# Patient Record
Sex: MALE | Race: WHITE
[De-identification: names, ages, dates, MRNs, and addresses within clinical notes are randomized per-mention and may not be internally consistent; named-entity substitution may affect disease eponyms.]

---

## 2017-02-20 ENCOUNTER — HOSPITAL ENCOUNTER (OUTPATIENT)
Dept: HOSPITAL 75 - LAB | Age: 47
End: 2017-02-20
Attending: NURSE PRACTITIONER
Payer: COMMERCIAL

## 2017-02-20 DIAGNOSIS — Z13.220: ICD-10-CM

## 2017-02-20 DIAGNOSIS — Z13.29: ICD-10-CM

## 2017-02-20 DIAGNOSIS — B20: Primary | ICD-10-CM

## 2017-02-20 LAB
ALBUMIN SERPL-MCNC: 4.1 G/DL (ref 3.2–4.5)
ALT SERPL-CCNC: 21 U/L (ref 0–55)
ANION GAP SERPL CALC-SCNC: 9 MMOL/L (ref 5–14)
AST SERPL-CCNC: 15 U/L (ref 5–34)
BASOPHILS # BLD AUTO: 0 10^3/UL (ref 0–0.1)
BASOPHILS NFR BLD AUTO: 0 % (ref 0–10)
BILIRUB SERPL-MCNC: 2.6 MG/DL (ref 0.1–1)
BUN SERPL-MCNC: 14 MG/DL (ref 7–18)
BUN/CREAT SERPL: 13
CALCIUM SERPL-MCNC: 8.7 MG/DL (ref 8.5–10.1)
CHLORIDE SERPL-SCNC: 110 MMOL/L (ref 98–107)
CHOLEST SERPL-MCNC: 148 MG/DL (ref ?–200)
CO2 SERPL-SCNC: 21 MMOL/L (ref 21–32)
CREAT SERPL-MCNC: 1.04 MG/DL (ref 0.6–1.3)
EOSINOPHIL # BLD AUTO: 0.1 10^3/UL (ref 0–0.3)
EOSINOPHIL NFR BLD AUTO: 2 % (ref 0–10)
ERYTHROCYTE [DISTWIDTH] IN BLOOD BY AUTOMATED COUNT: 12.8 % (ref 10–14.5)
GFR SERPLBLD BASED ON 1.73 SQ M-ARVRAT: > 60 ML/MIN
GLUCOSE SERPL-MCNC: 106 MG/DL (ref 70–105)
LDLC SERPL DIRECT ASSAY-MCNC: 88 MG/DL (ref 1–129)
LYMPHOCYTES # BLD AUTO: 2 X 10^3 (ref 1–4)
LYMPHOCYTES NFR BLD AUTO: 27 % (ref 12–44)
MCH RBC QN AUTO: 32 PG (ref 25–34)
MCHC RBC AUTO-ENTMCNC: 36 G/DL (ref 32–36)
MCV RBC AUTO: 87 FL (ref 80–99)
MONOCYTES # BLD AUTO: 1 X 10^3 (ref 0–1)
MONOCYTES NFR BLD AUTO: 14 % (ref 0–12)
NEUTROPHILS # BLD AUTO: 4.2 X 10^3 (ref 1.8–7.8)
NEUTROPHILS NFR BLD AUTO: 57 % (ref 42–75)
PLATELET # BLD: 184 10^3/UL (ref 130–400)
PMV BLD AUTO: 10.1 FL (ref 7.4–10.4)
POTASSIUM SERPL-SCNC: 3.8 MMOL/L (ref 3.6–5)
PROT SERPL-MCNC: 6.6 G/DL (ref 6.4–8.2)
RBC # BLD AUTO: 4.92 10^6/UL (ref 4.35–5.85)
SODIUM SERPL-SCNC: 140 MMOL/L (ref 135–145)
TRIGL SERPL-MCNC: 223 MG/DL (ref ?–150)
TSH SERPL-ACNC: 1.02 UIU/ML (ref 0.35–4.94)
VLDLC SERPL CALC-MCNC: 45 MG/DL (ref 5–40)
WBC # BLD AUTO: 7.4 10^3/UL (ref 4.3–11)

## 2017-02-20 PROCEDURE — 84439 ASSAY OF FREE THYROXINE: CPT

## 2017-02-20 PROCEDURE — 84443 ASSAY THYROID STIM HORMONE: CPT

## 2017-02-20 PROCEDURE — 86360 T CELL ABSOLUTE COUNT/RATIO: CPT

## 2017-02-20 PROCEDURE — 87536 HIV-1 QUANT&REVRSE TRNSCRPJ: CPT

## 2017-02-20 PROCEDURE — 80053 COMPREHEN METABOLIC PANEL: CPT

## 2017-02-20 PROCEDURE — 80061 LIPID PANEL: CPT

## 2017-02-20 PROCEDURE — 36415 COLL VENOUS BLD VENIPUNCTURE: CPT

## 2017-02-20 PROCEDURE — 85025 COMPLETE CBC W/AUTO DIFF WBC: CPT

## 2017-02-22 LAB
Lab: 28.9 % (ref 18–44)
Lab: 37 % (ref 15–39)
Lab: 7.3 /UL (ref 4.3–11)
Lab: 781 /UL (ref 200–1300)

## 2017-02-23 LAB
CD3+CD4+ CELLS/CD3+CD8+ CLL BLD: 1.1 RATIO (ref 1.2–6)
Lab: 41.2 %
Lab: 868.2 /UL (ref 500–2000)

## 2017-09-09 ENCOUNTER — HOSPITAL ENCOUNTER (OUTPATIENT)
Dept: HOSPITAL 75 - RAD | Age: 47
End: 2017-09-09
Attending: ORTHOPAEDIC SURGERY
Payer: COMMERCIAL

## 2017-09-09 DIAGNOSIS — M51.26: Primary | ICD-10-CM

## 2017-09-09 DIAGNOSIS — M48.06: ICD-10-CM

## 2017-09-09 PROCEDURE — 72148 MRI LUMBAR SPINE W/O DYE: CPT

## 2017-09-09 NOTE — DIAGNOSTIC IMAGING REPORT
PROCEDURE: MRI lumbar spine.



TECHNIQUE: Multiplanar, multisequence MRI of the lumbar spine was

performed without contrast.



INDICATION: Back pain. 



COMPARISON: None.



FINDINGS: There are 5 lumbar type vertebral bodies presumed for

the purposes of this report. Normal alignment. Vertebral body

heights are maintained. Normal bone marrow signal. No abnormal

signal in the conus which terminates at L1. Normal morphology of

the cauda equina without evidence of arachnoiditis. The

visualized abdominal and pelvic contents are unremarkable.



At L3-L4, there is a right subarticular radial tear and disc

protrusion which moderately narrows the right lateral recess.



At L4-L5, there is a right foraminal radial tear and disc

protrusion which, along with facet arthropathy at this level,

results in moderate right neural foraminal narrowing.



The intervertebral disc are otherwise preserved. No other

evidence of neural impingement in the lumbar spine.



IMPRESSION: Disc protrusions result in moderate right lateral

recess narrowing at L3-L4 and right neural foraminal narrowing at

L4-L5. Both of these findings would account for a right L4

radiculopathy.



Dictated by: 



  Dictated on workstation # [id number]

## 2017-09-18 ENCOUNTER — HOSPITAL ENCOUNTER (EMERGENCY)
Dept: HOSPITAL 75 - ER | Age: 47
Discharge: HOME | End: 2017-09-18
Payer: COMMERCIAL

## 2017-09-18 VITALS — SYSTOLIC BLOOD PRESSURE: 118 MMHG | DIASTOLIC BLOOD PRESSURE: 78 MMHG

## 2017-09-18 VITALS — WEIGHT: 220 LBS | HEIGHT: 72 IN | BODY MASS INDEX: 29.8 KG/M2

## 2017-09-18 DIAGNOSIS — M54.6: Primary | ICD-10-CM

## 2017-09-18 DIAGNOSIS — M54.41: ICD-10-CM

## 2017-09-18 PROCEDURE — 72146 MRI CHEST SPINE W/O DYE: CPT

## 2017-09-18 PROCEDURE — 72148 MRI LUMBAR SPINE W/O DYE: CPT

## 2017-09-18 PROCEDURE — 99282 EMERGENCY DEPT VISIT SF MDM: CPT
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
## 2017-09-18 NOTE — DIAGNOSTIC IMAGING REPORT
INDICATION: Mid back pain.



COMPARISON:  None.



TECHNIQUE: Multiplanar multisequence MRI images of the thoracic

spine were obtained without contrast.



FINDINGS:  

Alignment is normal. There is no subluxation or fracture. No

osseous lesion identified. The course and caliber of the thoracic

cord are normal. There is minimal to mild diffuse degenerative

disc disease. There is a broad-based posterior disc bulge at

T6-T7 and T7-T8. However, there is no foraminal or central canal

stenosis. The neural foramen and  posterior elements are normal.

There is no paraspinous mass.



IMPRESSION:

1. Minimal to mild diffuse degenerative disc disease with

posterior broad-based disc bulges at T6-T7, T7-T8.

2. No foraminal or central canal stenosis.

3. Normal cord signal.



Dictated by: 



  Dictated on workstation # [id number]

## 2017-09-18 NOTE — DIAGNOSTIC IMAGING REPORT
PROCEDURE: MRI lumbar spine.



TECHNIQUE: Multiplanar, multisequence MRI of the lumbar spine was

performed without contrast.



INDICATION: Back Pain. Radiculopathy. Recent trauma. 



COMPARISON: 9/9/17.



FINDINGS:  

Alignment is stable and unchanged. There is no subluxation or

fracture. There is no paraspinous mass or osseous lesion. No

traumatic disc herniation is identified. There is no epidural

hematoma or paraspinous mass. The conus medullaris and nerve

roots are grossly normal. The degenerative changes recently

described on the the prior examination at L3-4 and L4-5 are

unchanged.



IMPRESSION:

1. No traumatic malalignment or fracture.

2. No paraspinous mass or hematoma.

3. No traumatic disc herniation.

4. Stable degenerative changes at L3-4 and L4-5. See previously

dictated MRI report.



Dictated by: 



  Dictated on workstation # [id number]

## 2017-10-12 ENCOUNTER — HOSPITAL ENCOUNTER (OUTPATIENT)
Dept: HOSPITAL 75 - RAD | Age: 47
End: 2017-10-12
Attending: ORTHOPAEDIC SURGERY
Payer: COMMERCIAL

## 2017-10-12 DIAGNOSIS — M48.02: Primary | ICD-10-CM

## 2017-10-12 DIAGNOSIS — M54.12: ICD-10-CM

## 2017-10-12 PROCEDURE — 72141 MRI NECK SPINE W/O DYE: CPT

## 2017-10-12 NOTE — DIAGNOSTIC IMAGING REPORT
PROCEDURE: MR imaging cervical spine without contrast.



TECHNIQUE: Multiplanar, multisequence MR imaging of the cervical

spine was performed without contrast.



INDICATION: Neck pain.



FINDINGS: There is mild posterior translation of C5 over C6. 2 mm

of posterior translation of the vertebra is seen at this level.

Otherwise, the posterior spinal line alignment is satisfactory.

The vertebral body heights are preserved. There is moderate disc

height loss at C5/6 and mild disc height loss at C6/7 levels.



There is disc desiccation at all levels.



The spinal cord has normal signal and caliber.



The foramen magnum and upper cervical canal are patent.



C2/3: No disc herniation, no spinal canal or foraminal stenosis.



C3/4: No disc herniation, no spinal canal or foraminal stenosis.



C4/5: There is a right paracentral disc spur complex with no

associated spinal canal stenosis. There is uncovertebral joint

hypertrophy on the right side resulting in mild neural foraminal

stenosis. There is also mild foraminal stenosis on the left

related to facet joint hypertrophy.



C5/6: There is mild posterior translation of C5 over C6. There is

a prominent disc spur complex with slight inferior migration

component and a prominent posterior ligamentous hypertrophy.

There is severe spinal canal stenosis reducing the AP dimension

of the canal to 5.8 mm with mild cord compression. No cord signal

abnormality. There is bilateral severe foramina stenosis.



C6/7: There is a spur disc complex associated with

moderate-to-severe spinal canal stenosis reducing the AP

dimension of the canal to 6.7 mm. There is mild cord compression.

The foramina demonstrates severe stenosis on the left and

moderate stenosis on the right.



C7/T1: There is no disc herniation and no spinal canal stenosis.

The foramina demonstrate no stenosis on the right and moderate to

severe stenosis on the left. This appears to relate to mostly

facet and some uncovertebral hypertrophy.



IMPRESSION: There is severe spinal canal stenosis with associated

cord compression but no cord signal abnormality at C5/6 and C6/7

levels. 



Dictated by: 



  Dictated on workstation # [id number]

## 2019-02-19 ENCOUNTER — HOSPITAL ENCOUNTER (OUTPATIENT)
Dept: HOSPITAL 75 - PREOP | Age: 49
Discharge: HOME | End: 2019-02-19
Attending: SURGERY
Payer: COMMERCIAL

## 2019-02-19 VITALS — BODY MASS INDEX: 29.8 KG/M2 | HEIGHT: 72 IN | WEIGHT: 220 LBS

## 2019-02-19 DIAGNOSIS — Z01.818: Primary | ICD-10-CM

## 2019-02-20 ENCOUNTER — HOSPITAL ENCOUNTER (OUTPATIENT)
Dept: HOSPITAL 75 - ENDO | Age: 49
Discharge: HOME | End: 2019-02-20
Attending: SURGERY
Payer: COMMERCIAL

## 2019-02-20 VITALS — WEIGHT: 220 LBS | BODY MASS INDEX: 29.8 KG/M2 | HEIGHT: 72 IN

## 2019-02-20 VITALS — DIASTOLIC BLOOD PRESSURE: 102 MMHG | SYSTOLIC BLOOD PRESSURE: 140 MMHG

## 2019-02-20 VITALS — SYSTOLIC BLOOD PRESSURE: 136 MMHG | DIASTOLIC BLOOD PRESSURE: 94 MMHG

## 2019-02-20 VITALS — SYSTOLIC BLOOD PRESSURE: 133 MMHG | DIASTOLIC BLOOD PRESSURE: 101 MMHG

## 2019-02-20 VITALS — SYSTOLIC BLOOD PRESSURE: 140 MMHG | DIASTOLIC BLOOD PRESSURE: 102 MMHG

## 2019-02-20 DIAGNOSIS — K64.8: ICD-10-CM

## 2019-02-20 DIAGNOSIS — K31.89: ICD-10-CM

## 2019-02-20 DIAGNOSIS — E78.5: ICD-10-CM

## 2019-02-20 DIAGNOSIS — K29.50: Primary | ICD-10-CM

## 2019-02-20 DIAGNOSIS — Z79.899: ICD-10-CM

## 2019-02-20 DIAGNOSIS — I10: ICD-10-CM

## 2019-02-20 DIAGNOSIS — G62.9: ICD-10-CM

## 2019-02-20 DIAGNOSIS — K21.0: ICD-10-CM

## 2019-02-20 DIAGNOSIS — K29.80: ICD-10-CM

## 2019-02-20 DIAGNOSIS — F41.9: ICD-10-CM

## 2019-02-20 DIAGNOSIS — K57.30: ICD-10-CM

## 2019-02-20 DIAGNOSIS — K44.9: ICD-10-CM

## 2019-02-20 DIAGNOSIS — D12.2: ICD-10-CM

## 2019-02-20 DIAGNOSIS — D12.4: ICD-10-CM

## 2019-02-20 DIAGNOSIS — F20.9: ICD-10-CM

## 2019-02-20 DIAGNOSIS — B20: ICD-10-CM

## 2019-02-20 DIAGNOSIS — F17.210: ICD-10-CM

## 2019-02-20 DIAGNOSIS — F31.9: ICD-10-CM

## 2019-02-20 DIAGNOSIS — K22.70: ICD-10-CM

## 2019-02-20 PROCEDURE — 88305 TISSUE EXAM BY PATHOLOGIST: CPT

## 2019-02-20 NOTE — ENDOSCOPY DISCHARGE INSTRUCT
Endo Procedure/Findings


Findings


1.:  Gastritis, Padilla's Esophagus


2.:  Polyp


3.:  Diverticulosis


4.:  Internal Hemorrhoids





Discharge Instructions


-


Activity: You might feel a little sleepy until tomorrow.  This is due to the 

medicine you received to relax you.





Until tomorrow, you should:  


   NOT drive a car, operate machinery or power tools.


   NOT drink any alcoholic beverages.


   NOT make any important decisions or sign importortant papers.





Do not return to work until tomorrow, unless otherwise instructed. Resume 

previous activities tomorrow.





Diet: Start by taking liquids.  If you tolerate liquids, advance to solid food.





make an appointment for one week.


Instructions:


1.:  Colonscopy in 3 years


2.:  EGD in 6-8 weeks





Notify Physician


-


If you experience excessive bleeding, unusual abdominal pain, fever, or chest 

pain, contact your doctor immediately.





Follow-Up:


-


I have received and understand the above instructions and will call my doctor 

if I have any further questions.








________________________             ____________


Patient Signature                Date





________________________


Nurse Signature








________________________


Other (Relationship)











HIRAM ESPINO DO Feb 20, 2019 15:48

## 2019-02-20 NOTE — XMS REPORT
[facility]

 Created on: 2017



Ronda Farhta

External Reference #: [id number]

: 1970

Sex: Male



Demographics







 Address  [address] [place] [address]  [address]

 

 Preferred Language  Unknown

 

 Marital Status  Unknown

 

 Rastafarian Affiliation  Unknown

 

 Race  Unknown

 

 Ethnic Group  Unknown





Author







 Author  Lana Durand

 

 [facility]

 

 Address  [address], [place]  [id number]



 

 Phone  [phone]







Care Team Providers







 Care Team Member Name  Role  Phone

 

 Lana Durand  Unavailable  [phone]







PROBLEMS







 Type  Condition  ICD9-CM Code  JDT30-XQ Code  Onset Dates  Condition Status  
SNOMED Code

 

 Problem  Benign essential hypertension     I10     Active  8578039

 

 Problem  Arthralgia of right ankle     M25.571     Active  470822313

 

 Problem  Depression     F32.9     Active  09998599

 

 Problem  Acquired immunodeficiency syndrome     B20     Active  39852259

 

 Problem  Mixed hyperlipidemia     E78.2     Active  393175002

 

 Problem  Generalized osteoarthritis of multiple sites     M15.9     Active  
[id number]

 

 Problem  Bronchiolitis     J21.9     Active  4231583

 

 Problem  Cigarette smoker     F17.210     Active  51405834

 

 Problem  Erectile dysfunction, unspecified erectile dysfunction type     N52.9
     Active  092838197

 

 Problem  Other chronic pain     G89.29     Active  14686914

 

 Problem  Crushing injury of right foot, sequela     S97.81XS     Active  
64874186







ALLERGIES

No Known Allergies



SOCIAL HISTORY

Never Assessed



PLAN OF CARE







 Activity  Details









  









 Follow Up  3 Months Reason:

 

 Pending Test  Human Immunodeficiency Virus (HIV-1), Quantitative, Real-time 
PCR (graph) 75497 

 

 Pending Test  Metabolic Panel (14), Comprehensive (CMP) 54581 

 

 Pending Test  CD4/CD8 Ratio Profile 93980 



   



VITAL SIGNS







 Height  72 in  2017

 

 Weight  223 lbs  2017

 

 Temperature  96.9 degrees Fahrenheit  2017

 

 Heart Rate  91 /min  2017

 

 Respiratory Rate  18 /min  2017

 

 Oximetry  97 %  2017

 

 BMI  30.24 kg/m2  2017

 

 Blood pressure systolic  148 mm Hg  2017

 

 Blood pressure diastolic  86 mm Hg  2017







MEDICATIONS







 Medication  Instructions  Dosage  Frequency  Start Date  End Date  Duration  
Status

 

 Ventolin  (90 Base) MCG/ACT  Inhalation every 4 hrs  2 puffs as needed  
4h  10 Feb, 2017     30 days  Active

 

 Lisinopril 40     TAKE ONE TABLET BY MOUTH DAILY           30  Active

 

 Voltaren 1 %  Transdermal four times a day  2-4 gm apply to right foot/ankle  
6h  21 Oct, 2016     30 days  Active

 

 Norco  MG  Orally every 12 hrs  1 tablet as needed  12h  11 Aug, 2017   
  30 days  Active

 

 Pravastatin Sodium 40 MG  Orally Once a day  1 tablet  24h  04 2015     
30 day(s)  Active

 

 Cialis 20 MG  Orally every 72 hrs  1 tablet as needed     17 May, 2013     30 
days  Active

 

 Evotaz 300/150 mg  Oral Daily  1  24h  15 May, 2015     30 days  Active

 

 Zoloft 100 MG  Orally Once a day  1 tablet  24h  30 2015     30 days  
Active

 

 Descovy 200-25 mg  Orally Once a day  1 Tablet  24h       30 days  
Active

 

 Ventolin HFA 90  Inhalation every 4 hrs  2 puffs as needed  4h        16  
Active

 

 Ibuprofen 800     TAKE ONE TABLET BY MOUTH THREE TIMES A DAY AS NEEDED        
   30  Active







RESULTS

No Results



PROCEDURES







 Procedure  Date Ordered  Result  Body Site

 

 COMPLETE BLOOD COUNT W/AUTO DIFF  2017      

 

 T CELL, ABSOLUTE COUNT/RATIO  2017      

 

 COMPREHEN METABOLIC PANEL  2017      

 

 HIV-1, DNA, QUANT  2017      

 

 IMMUNIZATION ADMIN  2017      

 

 FLU VAC NO PRSV 4 SUNI 3 YRS+  2017      







IMMUNIZATIONS







 Vaccine  Route  Administration Date  Status

 

 Influenza Split 3 yrs > (QUAD)  IM Intramuscular  2017  Administered







MEDICAL (GENERAL) HISTORY







 Type  Description  Date

 

 Medical History  Benign essential HTN , (Desc:Benign essential hypertension) ;
   

 

 Medical History  Acquired immune deficiency syndrome ;   

 

 Medical History  Smoking ;   

 

 Medical History  Human immunodeficiency virus (HIV) disease   

 

 Medical History  Nondependent tobacco use disorder   

 

 Medical History  Essential hypertension, benign   

 

 Medical History  Pain in soft tissues of limb   

 

 Medical History  Hyperglycemia   

 

 Medical History  ED (erectile dysfunction)   

 

 Medical History  Hyperlipidemia   

 

 Medical History  Smoker   

 

 Medical History  Depression   

 

 Surgical History  R fibula fx  2007

## 2019-02-20 NOTE — XMS REPORT
[facility]

 Created on: 2018



Farhat Bond

External Reference #: [id number]

: 1970

Sex: Male



Demographics







 Address  [address] [place] [address]  [address]

 

 Preferred Language  Unknown

 

 Marital Status  Unknown

 

 Hindu Affiliation  Unknown

 

 Race  Unknown

 

 Ethnic Group  Unknown





Author







 Author  Annia Chen

 

 Organization  [facility]

 

 Address  [address] [place] [address]  [id number]



 

 Phone  [phone]







Care Team Providers







 Care Team Member Name  Role  Phone

 

 Annia Chen  Unavailable  [phone]







PROBLEMS







 Type  Condition  ICD9-CM Code  MTX41-NZ Code  Onset Dates  Condition Status  
SNOMED Code

 

 Problem  Benign essential hypertension     I10     Active  2983716

 

 Problem  Arthralgia of right ankle     M25.571     Active  459761122

 

 Problem  Depression     F32.9     Active  94745721

 

 Problem  Acquired immunodeficiency syndrome     B20     Active  70840657

 

 Problem  Mixed hyperlipidemia     E78.2     Active  127610696

 

 Problem  Generalized osteoarthritis of multiple sites     M15.9     Active  
779175520

 

 Problem  Bronchiolitis     J21.9     Active  0901520

 

 Problem  Cigarette smoker     F17.210     Active  14393239

 

 Problem  Erectile dysfunction, unspecified erectile dysfunction type     N52.9
     Active  618301246

 

 Problem  Other chronic pain     G89.29     Active  70362128

 

 Problem  Crushing injury of right foot, sequela     S97.81XS     Active  
98608916







ALLERGIES

No Information



ENCOUNTERS







 Encounter  Location  Date  Diagnosis

 

 [facility]  [address] [place] [address] 
[id number]     

 

 [address] [address]     

 

 [address] [address]  15 
May, 2018  Acquired immunodeficiency syndrome B20 and Human immunodeficiency 
virus (HIV) disease 042

 

 [facility] [address] 
[id number]    Acquired immunodeficiency syndrome B20

 

 [address] [address]     

 

 [address] [address]     

 

 [address] [address]     

 

 [facility]  [address] [place] [id number]    Acquired immunodeficiency syndrome B20 and Influenza vaccine needed 
Z23

 

 [facility]  [address] [place] [id number]  24 
Oct, 2017   

 

 [address] [address]  03 
Oct, 2017  Depression F32.9

 

 [address] [address]  24 
Aug, 2017   

 

 [facility] [address] 
[id number]  11 Aug, 2017  Acquired immune deficiency syndrome B20 ; Long term 
current use of opiate analgesic Z79.891 ; Benign essential hypertension I10 ; 
Mixed hyperlipidemia E78.2 ; Cigarette smoker F17.210 and Generalized 
osteoarthritis of multiple sites M15.9

 

 [address] 
[id number]  12 May, 2017  Acquired immune deficiency syndrome B20 ; Depression 
F32.9 ; Benign essential hypertension I10 ; Mixed hyperlipidemia E78.2 and 
Cigarette smoker F17.210

 

 [address] [address]     

 

 [address] 
[id number]  10 Feb, 2017  Acquired immune deficiency syndrome B20 ; Screening 
examination for sexually transmitted disease Z11.3 ; Pain in right ankle and 
joints of right foot M25.571 ; Other chronic pain G89.29 ; Bronchiolitis J21.9 
and Depression F32.9

 

 [facility]  [address] [place] [id number]  21 Oct, 2016  
Acquired immunodeficiency syndrome B20 ; Influenza vaccine needed Z23 ; 
Erectile dysfunction, unspecified erectile dysfunction type N52.9 ; Arthralgia 
of right ankle M25.571 and Crushing injury of right foot, sequela S97.81XS

 

 [address] [address]  08 
Sep, 2016   

 

 [address] [address]  08 
Sep, 2016   

 

 [address] 
[id number]    Acquired immunodeficiency syndrome B20

 

 [facility] [address] 
[id number]  18 Mar, 2016  Acquired immune deficiency syndrome B20 ; Encounter 
for immunization Z23 ; Screening examination for sexually transmitted disease 
Z11.3 and Depression F32.9

 

 [facility]  [address] [place] [id number]     

 

 [address] [address]    Essential (primary) hypertension I10

 

 [facility] [address] 
[id number]  18 Dec, 2015  Human immunodeficiency virus (HIV) disease B20 and 
Smoking F17.200

 

 [address] [address]  10 
Nov, 2015   

 

 [address] [address]    Depression F32.9

 

 [address] [address]  24 
Sep, 2015  Hyperlipidemia 272.4

 

 [address] [address]  10 
Sep, 2015  Hyperlipidemia 272.4

 

 [address] 
[id number]  04 Sep, 2015  Human immunodeficiency virus (HIV) disease 042 ; Needs 
flu shot V04.81 ; Hyperlipidemia 272.4 ; Smoker 305.1 and Depression 311

 

 [address] [address]  12 
Aug, 2015   

 

 [address] [address]     

 

 [facility] [address] 
[id number]  15 May, 2015  Human immunodeficiency virus (HIV) disease 042 ; 
Nondependent tobacco use disorder 305.1 ; Essential hypertension, benign 401.1 
and Hyperglycemia 790.29

 

 [address] [address]     

 

 [address] [address]     

 

 [facility]  [address] [place] [facility] [place] [address]     

 

 [facility]  [address] [place] [facility] [address]     

 

 [facility]  [address] [place] 
[id number]    Asymptomatic human immunodeficiency virus (HIV) 
infection status V08 ; Smoker 305.1 and Depression 311

 

 [facility]  [address] [place], [place] 
[id number]  12 Sep, 2014  Essential hypertension, benign 401.1 ; Nondependent 
tobacco use disorder 305.1 and HIV (human immunodeficiency virus infection) V08

 

 [facility] [place] [facility]  [address] [facility] [address]  [place], [place] [id number]  29 Aug, 2014 
  

 

 [facility] [place] [facility]  [address] [facility] [address]  [place], [place] [id number]   
  

 

  [facility]  [address] [place] [facility] [place] [address]     

 

 [facility]  [address] [place] [facility] [address]     

 

 [facility]  [address] [place] [facility] [address]  04 
Oct, 2013   

 

 [facility]  [address] [place] [facility] [address]     

 

 [facility]  [address] [place] [facility] [address]     

 

 [facility]  [address] [place] [facility] [address]     

 

 [facility]  [address] [place] [facility] [address]     

 

 [facility]  [address] [place] [facility] [address]  14 
Sep, 2012   

 

 [facility]  [address] [place] [facility] [address]  03 
Aug, 2012   

 

 [facility]  [address] [place] [facility] [address]  30 
Mar, 2012   

 

 [facility]  [address] [place] [address]     

 

 [facility]  [address] [facility] [address]  [place], [place] [id number]  07 Oct, 2011 
  







IMMUNIZATIONS

No Known Immunizations



SOCIAL HISTORY

Never Assessed



REASON FOR VISIT





PLAN OF CARE





VITAL SIGNS





MEDICATIONS







 Medication  Instructions  Dosage  Frequency  Start Date  End Date  Duration  
Status

 

 Ibuprofen 800 mg     TAKE ONE TABLET BY MOUTH THREE TIMES A DAY AS NEEDED     
      30  Active







RESULTS

No Results



PROCEDURES

No Known procedures



INSTRUCTIONS





MEDICATIONS ADMINISTERED

No Known Medications



MEDICAL (GENERAL) HISTORY







 Type  Description  Date

 

 Medical History  Benign essential HTN , (Desc:Benign essential hypertension) ;
   

 

 Medical History  Acquired immune deficiency syndrome ;   

 

 Medical History  Smoking ;   

 

 Medical History  Human immunodeficiency virus (HIV) disease   

 

 Medical History  Nondependent tobacco use disorder   

 

 Medical History  Essential hypertension, benign   

 

 Medical History  Pain in soft tissues of limb   

 

 Medical History  Hyperglycemia   

 

 Medical History  ED (erectile dysfunction)   

 

 Medical History  Hyperlipidemia   

 

 Medical History  Smoker   

 

 Medical History  Depression   

 

 Surgical History  R fibula fx  2007

## 2019-02-20 NOTE — XMS REPORT
[facility]

 Created on: 2016



AimeesFarhat

External Reference #: [id number]

: 1970

Sex: Male



Demographics







 Address  [address] 

[place]  [address]

 

 Home Phone  [phone]

 

 Preferred Language  Unknown

 

 Marital Status  Unknown

 

 Latter day Affiliation  Unknown

 

 Race  White

 

 Ethnic Group  Not  or 





Author







 Author  Johanna See

 

 [facility]  eClinical[employer]

 

 Address  Unknown

 

 Phone  Unavailable







Care Team Providers







 Care Team Member Name  Role  Phone

 

 Johanna See    Unavailable



                                                                



Allergies

          No Known Allergies                                                   
                                     



Problems

          





 Problem Type  Condition  Code  Onset Dates  Condition Status

 

 Assessment  Hyperglycemia  790.29     Active

 

 Problem  Smoking  F17.200     Active

 

 Problem  Benign essential hypertension  I10     Active

 

 Problem  Acquired immunodeficiency syndrome  B20     Active

 

 Assessment  Nondependent tobacco use disorder  305.1     Active

 

 Assessment  Essential hypertension, benign  401.1     Active

 

 Problem  Mixed hyperlipidemia  E78.2     Active

 

 Assessment  Human immunodeficiency virus (HIV) disease  042     Active



                                                                               
                                                                               



Medications

          





 Medication  Code System  Code  Instructions  Start Date  End Date  Status  
Dosage

 

 Pravastatin Sodium  NDC  [address]-10  40 MG Orally QHS  2015        
1 tablet

 

 Ibuprofen  NDC  70943191160  800             TAKE ONE TABLET BY MOUTH THREE 
TIMES A DAY AS NEEDED

 

 Lisinopril  NDC  98541046040  20 Orally Once a day           2 tablet s

 

 Evotaz  NDC  6478-5081-99  300/150 mg Oral Daily  May 15, 2015        1

 

 Cialis  NDC  [address]-29  20 MG Oral 1 (one) Tablet every 72 hours prn  May 
17, 2013        mailed to patient

 

 Jhonny  NDC  35380012849  200-300             TAKE ONE TABLET BY MOUTH EVERY 
DAY

 

 Chantix  NDC  [address]-08  1 MG Orally Twice a day  2015        1 
tablet

 

 Zoloft  NDC  [address]-80  100 MG Orally Once a day  2015        1 
tablet



                                                                               
                                                                               



Procedures

          





 Procedure  Coding System  Code  Date

 

 COMPREHEN METABOLIC PANEL  CPT-4  40100  May 15, 2015

 

 GLYCATED HEMOGLOBIN TEST SO  CPT-4  21732  May 15, 2015

 

 T CELL, ABSOLUTE COUNT/RATIO  CPT-4  87961  May 15, 2015

 

 VENIPUNCT, ROUTINE*  CPT-4  28552  May 15, 2015

 

  3-10 Min in SX Smoker  CPT-4  56714  May 15, 2015

 

 ASSAY OF INSULIN  CPT-4  00908  May 15, 2015

 

 COMPLETE BLOOD COUNT W/AUTO DIFF  CPT-4  83289  May 15, 2015

 

 Office Visit, Est Pt., Level 3  CPT-4  84204  May 15, 2015



                                                                               
                                                                               
          



Vital Signs

          





 Date/Time:  May 15, 2015

 

 Temperature  98.0 F

 

 Weight  224.4 lbs

 

 Height  71.5 in

 

 Respiratory Rate  18 /min

 

 Cardiac Monitoring Heart Rate  67 /min

 

 Blood Pressure Diastolic  96 mm Hg

 

 Blood Pressure Systolic  153 mm Hg

 

 BMI  30.86 Index



                                                                              



Results

          No Known Results                                                     
               



Summary Purpose

          eClinicalWorks Submission
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
## 2019-02-20 NOTE — XMS REPORT
[facility]

 Created on: 2018



Farhat Bond

External Reference #: [id number]

: 1970

Sex: Male



Demographics







 Address  [address] [place] [address]  [address]

 

 Preferred Language  Unknown

 

 Marital Status  Unknown

 

 Jewish Affiliation  Unknown

 

 Race  Unknown

 

 Ethnic Group  Unknown





Author







 Author  Annia Chen

 

 [facility]

 

 Address  [address] [place], [place]  [address]



 

 Phone  [phone]







Care Team Providers







 Care Team Member Name  Role  Phone

 

 Annia Chen  Unavailable  [phone]







PROBLEMS





ALLERGIES

No Information



ENCOUNTERS





IMMUNIZATIONS

No Known Immunizations



SOCIAL HISTORY

No smoking Hx information available



REASON FOR VISIT





PLAN OF CARE





VITAL SIGNS





MEDICATIONS

Unknown Medications



RESULTS

No Results



PROCEDURES

No Known procedures



INSTRUCTIONS





MEDICATIONS ADMINISTERED

No Known Medications



MEDICAL (GENERAL) HISTORY

## 2019-02-20 NOTE — XMS REPORT
[facility]

 Created on: 2015



AimeesFarhat

External Reference #: [id number]

: 1970

Sex: Male



Demographics







 Address  [address] [place] [address]  [address]

 

 Home Phone  [phone]

 

 Preferred Language  Unknown

 

 Marital Status  Unknown

 

 Judaism Affiliation  Unknown

 

 Race  White

 

 Ethnic Group  Not  or 





Author







 Author  Annia Chen

 

 [facility]  [employer]

 

 Address  Unknown

 

 Phone  Unavailable







Care Team Providers







 Care Team Member Name  Role  Phone

 

 Annia Chen  CP  Unavailable



                                                                



Allergies, Adverse Reactions, Alerts

          





 Substance  Reaction  Event Type

 

 N.K.D.A.  Info Not Available  Non Drug Allergy



                                                                               
         



Problems

          





 Problem Type  Condition  Code  Onset Dates  Condition Status

 

 Problem  Acquired immunodeficiency syndrome  B20     Active

 

 Problem  Smoking  F17.200     Active

 

 Problem  Depression  F32.9     Active

 

 Assessment  Human immunodeficiency virus (HIV) disease  B20     Active

 

 Assessment  Smoking  F17.200     Active

 

 Problem  Benign essential hypertension  I10     Active

 

 Problem  Mixed hyperlipidemia  E78.2     Active



                                                                               
                                                                     



Medications

          





 Medication  Code System  Code  Instructions  Start Date  End Date  Status  
Dosage

 

 Ibuprofen  NDC  61491720403  800MG             TAKE ONE TABLET BY MOUTH THREE 
TIMES DAILY AS NEEDED

 

 Cialis  NDC  [address]-09  20 MG Orally every 72 hrs  May 17, 2013        1 
tablet as needed

 

 Zoloft  NDC  [address]-84  100 MG Orally Once a day  2015        1 
tablet

 

 Evotaz  NDC  7466-3995-41  300/150 mg Oral Daily  May 15, 2015        1

 

 Truvada  NDC  08039360096  200-300 MG             TAKE ONE TABLET BY MOUTH 
ONCE DAILY

 

 Lisinopril  NDC  30543723044  20 Orally Once a day           2 tablet s

 

 Pravastatin Sodium  NDC  [address]-10  40 MG Orally Once a day  2015 
       1 tablet

 

 Wellbutrin SR  NDC  [address]-86  150 MG Orally Twice a day  2015   
     1 tablet



                                                                               
                                                                               



Procedures

          





 Procedure  Coding System  Code  Date

 

 COMPREHEN METABOLIC PANEL  CPT-4  09584  Dec 18, 2015

 

 Office Visit, Est Pt., Level 3  CPT-4  12584  Dec 18, 2015

 

 T CELL, ABSOLUTE COUNT/RATIO  CPT-4  41913  Dec 18, 2015



                                                                               
                                       



Vital Signs

          





 Date/Time:  Dec 18, 2015

 

 Temperature  97.7 F

 

 Weight  212 lbs

 

 Height  72 in

 

 Respiratory Rate  18 /min

 

 Cardiac Monitoring Heart Rate  78 /min

 

 Blood Pressure Diastolic  92 mm Hg

 

 Blood Pressure Systolic  138 mm Hg

 

 BMI  28.75 Index

 

 Oximetry  98 %



                                                                              



Results

          No Known Results                                                     
               



Summary Purpose

          eClinicalWorks Submission
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
## 2019-02-20 NOTE — XMS REPORT
[facility]

 Created on: 2017



Farhat Bond

External Reference #: [id number]

: 1970

Sex: Male



Demographics







 Address  [address] [place] [address]  [address]

 

 Preferred Language  Unknown

 

 Marital Status  Unknown

 

 Judaism Affiliation  Unknown

 

 Race  Unknown

 

 Ethnic Group  Unknown





Author







 Author  Johanna See

 

 [facility]

 

 Address  [address] [place], [place]  [address]



 

 Phone  [phone]







Care Team Providers







 Care Team Member Name  Role  Phone

 

 Johanna See  Unavailable  [phone]







PROBLEMS







 Type  Condition  ICD9-CM Code  VJF73-BP Code  Onset Dates  Condition Status  
SNOMED Code

 

 Problem  Acquired immunodeficiency syndrome     B20     Active  72595967

 

 Problem  Cigarette smoker     F17.210     Active  50390615

 

 Problem  Depression     F32.9     Active  01019322

 

 Problem  Mixed hyperlipidemia     E78.2     Active  772473055

 

 Problem  Benign essential hypertension     I10     Active  1109232

 

 Problem  Generalized osteoarthritis of multiple sites     M15.9     Active  
438307275

 

 Problem  Other chronic pain     G89.29     Active  49276688

 

 Problem  Crushing injury of right foot, sequela     S97.81XS     Active  
76184250

 

 Problem  Arthralgia of right ankle     M25.571     Active  701365369

 

 Problem  Bronchiolitis     J21.9     Active  9819056

 

 Problem  Erectile dysfunction, unspecified erectile dysfunction type     N52.9
     Active  161127333







ALLERGIES

Unknown Allergies



SOCIAL HISTORY

No smoking Hx information available



PLAN OF CARE





VITAL SIGNS





MEDICATIONS

Unknown Medications



RESULTS

No Results



PROCEDURES

No Known procedures



IMMUNIZATIONS

No Known Immunizations

## 2019-02-20 NOTE — XMS REPORT
[facility]

 Created on: 10/27/2017



Farhat Bond

External Reference #: [id number]

: 1970

Sex: Male



Demographics







 Address  [address]  [address]

 

 Preferred Language  Unknown

 

 Marital Status  Unknown

 

 Hoahaoism Affiliation  Unknown

 

 Race  Unknown

 

 Ethnic Group  Unknown





Author







 Author  Annia Chen

 

 [facility]

 

 Address  [address] [place], [place]  [address]



 

 Phone  [phone]







Care Team Providers







 Care Team Member Name  Role  Phone

 

 Annia Chen  Unavailable  [phone]







PROBLEMS







 Type  Condition  ICD9-CM Code  JPQ16-UD Code  Onset Dates  Condition Status  
SNOMED Code

 

 Problem  Benign essential hypertension     I10     Active  0038811

 

 Problem  Arthralgia of right ankle     M25.571     Active  522443332

 

 Problem  Depression     F32.9     Active  72754843

 

 Problem  Acquired immunodeficiency syndrome     B20     Active  76125554

 

 Problem  Mixed hyperlipidemia     E78.2     Active  273311607

 

 Problem  Generalized osteoarthritis of multiple sites     M15.9     Active  
391044735

 

 Problem  Bronchiolitis     J21.9     Active  9886656

 

 Problem  Cigarette smoker     F17.210     Active  69560264

 

 Problem  Erectile dysfunction, unspecified erectile dysfunction type     N52.9
     Active  414526182

 

 Problem  Other chronic pain     G89.29     Active  75234806

 

 Problem  Crushing injury of right foot, sequela     S97.81XS     Active  
77115070







ALLERGIES

No Information



SOCIAL HISTORY

Never Assessed



PLAN OF CARE





VITAL SIGNS





MEDICATIONS

Unknown Medications



RESULTS

No Results



PROCEDURES

No Known procedures



IMMUNIZATIONS

No Known Immunizations



MEDICAL (GENERAL) HISTORY







 Type  Description  Date

 

 Medical History  Benign essential HTN , (Desc:Benign essential hypertension) ;
   

 

 Medical History  Acquired immune deficiency syndrome ;   

 

 Medical History  Smoking ;   

 

 Medical History  Human immunodeficiency virus (HIV) disease   

 

 Medical History  Nondependent tobacco use disorder   

 

 Medical History  Essential hypertension, benign   

 

 Medical History  Pain in soft tissues of limb   

 

 Medical History  Hyperglycemia   

 

 Medical History  ED (erectile dysfunction)   

 

 Medical History  Hyperlipidemia   

 

 Medical History  Smoker   

 

 Medical History  Depression   

 

 Surgical History  R fibula fx  2007

## 2019-02-20 NOTE — XMS REPORT
[facility]

 Created on: 2016



Farhat Bond

External Reference #: [id number]

: 1970

Sex: Male



Demographics







 Address  [address]

[place]  [address]

 

 Home Phone  [phone]

 

 Preferred Language  Unknown

 

 Marital Status  Unknown

 

 Pentecostalism Affiliation  Unknown

 

 Race  White

 

 Ethnic Group  Not  or 





Author







 NATI Villeda

 

 [facility]  [employer]

 

 Address  Unknown

 

 Phone  Unavailable







Care Team Providers







 Care Team Member Name  Role  Phone

 

 NATI LYON  CP  Unavailable



                                                                



Allergies

          No Known Allergies                                                   
                                     



Problems

          





 Problem Type  Condition  Code  Onset Dates  Condition Status

 

 Problem  HIV (human immunodeficiency virus infection)  Z21     Active

 

 Problem  Essential hypertension  I10     Active

 

 Problem  Tobacco use  Z72.0     Active

 

 Problem  Hyperlipidemia, unspecified hyperlipidemia type  E78.5     Active

 

 Problem  Moderate single current episode of major depressive disorder  F32.1  
   Active



                                                                               
                                                 



Medications

          No Known Medications                                                 
                             



Results

          No Known Results                                                     
               



Summary Purpose

          [employer] Submission
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
## 2019-02-20 NOTE — XMS REPORT
[facility]

 Created on: 2015



Farhat Bond

External Reference #: [id number]

: 1970

Sex: Male



Demographics







 Address  [address] 

[place]  [address]

 

 Home Phone  [phone]

 

 Preferred Language  Unknown

 

 Marital Status  Unknown

 

 Mandaeism Affiliation  Unknown

 

 Race  White

 

 Ethnic Group  Not  or 





Author







 Author  Johanna See

 

 [facility]  [employer]

 

 Address  Unknown

 

 Phone  Unavailable







Care Team Providers







 Care Team Member Name  Role  Phone

 

 Johanna See    Unavailable



                                                                



Allergies

          No Known Allergies                                                   
                                     



Problems

          





 Problem Type  Condition  ICD-9 Code  Onset Dates  Condition Status

 

 Problem  Long term (current) use of opiate analgesic  V58.69     Inactive

 

 Problem  Impotence of organic origin  607.84     Inactive

 

 Problem  Need for prophylactic vaccination against streptococcus pneumoniae (
pneumococcus)  V03.82     Inactive

 

 Problem  Unspecified infectious and parasitic diseases  136.9     Inactive

 

 Problem  Essential hypertension, benign  401.1     Active

 

 Problem  Pain in soft tissues of limb  729.5     Inactive

 

 Problem  Human immunodeficiency virus [HIV]  042     Inactive

 

 Problem  Need for prophylactic vaccination and inoculation, Influenza  V04.81 
    Inactive

 

 Problem  Unspecified sinusitis (chronic)  473.9     Inactive

 

 Problem  Nondependent tobacco use disorder  305.1     Active

 

 Problem  Screening examination for venereal disease  V74.5     Inactive



                                                                               
                                                                               
                              



Medications

          No Known Medications                                                 
                             



Results

          No Known Results                                                     
               



Summary Purpose

          [employer] Submission

## 2019-02-20 NOTE — XMS REPORT
[facility]

 Created on: 2015



AimeesFarhat

External Reference #: [id number]

: 1970

Sex: Male



Demographics







 Address  [address] 

[place]  [address]

 

 Home Phone  [phone]

 

 Preferred Language  Unknown

 

 Marital Status  Unknown

 

 Rastafarian Affiliation  Unknown

 

 Race  White

 

 Ethnic Group  Not  or 





Author







 Author  Annia Chen

 

 [facility]  e[employer]

 

 Address  Unknown

 

 Phone  Unavailable







Care Team Providers







 Care Team Member Name  Role  Phone

 

 Annia Chen  CP  Unavailable



                                                                



Allergies, Adverse Reactions, Alerts

          





 Substance  Reaction  Event Type

 

 N.K.D.A.  Info Not Available  Non Drug Allergy



                                                                               
         



Problems

          





 Problem Type  Condition  ICD-9 Code  Onset Dates  Condition Status

 

 Problem  Long term (current) use of opiate analgesic  V58.69     Inactive

 

 Problem  Impotence of organic origin  607.84     Inactive

 

 Problem  Need for prophylactic vaccination against streptococcus pneumoniae (
pneumococcus)  V03.82     Inactive

 

 Problem  Essential hypertension, benign  401.1     Active

 

 Problem  Pain in soft tissues of limb  729.5     Inactive

 

 Problem  Human immunodeficiency virus [HIV]  042     Inactive

 

 Problem  Need for prophylactic vaccination and inoculation, Influenza  V04.81 
    Inactive

 

 Problem  Unspecified sinusitis (chronic)  473.9     Inactive

 

 Problem  Nondependent tobacco use disorder  305.1     Active

 

 Problem  Screening examination for venereal disease  V74.5     Inactive

 

 Assessment  Depression  311     Active

 

 Assessment  Smoker  305.1     Active

 

 Assessment  Asymptomatic human immunodeficiency virus (HIV) infection status  
V08     Active

 

 Problem  Unspecified infectious and parasitic diseases  136.9     Inactive



                                                                               
                                                                               
                                                            



Medications

          





 Medication  Code System  Code  Instructions  Start Date  End Date  Status  
Dosage

 

 Reyataz  NDC  [address]-40  200 MG Oral 2 (two) daily  2014        
not defined

 

 Ibuprofen  NDC  91945108920  800             TAKE ONE TABLET BY MOUTH THREE 
TIMES A DAY AS NEEDED

 

 Chantix  NDC  [address]-26  0.5 MG Orally Once a day  2015        1 
tablet

 

 Zoloft  NDC  [address]-02  50 MG Orally 0.5  tab x 7 days then 1 tab daily  
2015        1 tablet

 

 Cialis  NDC  [address]-34  20 MG Oral 1 (one) Tablet every 72 hours prn  May 
17, 2013        mailed to patient

 

 Norvir  NDC  80590062924  100             TAKE ONE CAPSULE BY MOUTH EVERY DAY

 

 Lisinopril  NDC  76304167285  20             TAKE 1 AND A HALF TABLETS BY 
MOUTH ONCE DAILY

 

 Chantix  NDC  [address]-73  1 MG Orally Twice a day  2015        1 
tablet

 

 Truvada  NDC  02679191948  200-300             TAKE ONE TABLET BY MOUTH EVERY 
DAY



                                                                               
                                                                               
          



Procedures

          





 Procedure  Coding System  Code  Date

 

 T CELL, ABSOLUTE COUNT/RATIO  CPT-4  46713  2015

 

 COMPREHEN METABOLIC PANEL  CPT-4  22708  2015

 

 Office Visit, New Pt., Level 3  CPT-4  09557  2015

 

 COMPLETE CBC W/AUTO DIFF WBC IH  CPT-4  15482  2015

 

 BLOOD SEROLOGY, QUALITATIVE  CPT-4  16919  2015

 

 LIPID PANEL SO  CPT-4  85776  2015

 

 ASSAY OF FREE THYROXINE  CPT-4  33436  2015

 

 ASSAY THYROID STIM HORMONE  CPT-4  01769  2015



                                                                               
                                                                               
          



Vital Signs

          





 Date/Time:  2015

 

 Temperature  97.9 F

 

 Weight  221.9 lbs

 

 Height  71.5 in

 

 Respiratory Rate  24 /min

 

 Cardiac Monitoring Heart Rate  80 /min

 

 Blood Pressure Diastolic  75 mm Hg

 

 Blood Pressure Systolic  121 mm Hg

 

 BMI  30.51 Index



                                                                              



Results

          No Known Results                                                     
               



Summary Purpose

          [employer] Submission
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
## 2019-02-20 NOTE — PROGRESS NOTE-POST OPERATIVE
Post-Operative Progess Note


Surgeon (s)/Assistant (s)


Surgeon


HIRAM ESPINO DO


Assistant:  none





Pre-Operative Diagnosis


Melena and Gastritis





Post-Operative Diagnosis





Gastritis, Duodenitis, Hiatal Hernia, Padilla's esophagus


Colon Polyps


Diverticula


Int hemorrhoids





Procedure & Operative Findings


Date of Procedure


2/20/19


Procedure Performed/Findings


EGD with bx


Colon with snare


Anesthesia Type


IV sedation by CRNA





Estimated Blood Loss


Estimated blood loss (mL):  scant





Specimens/Packing


Specimens Removed


duodenal, antral and GE jxn bx


Desc colon polyp x 2


Asc colon polyp x 2











HIRAM ESPINO DO Feb 20, 2019 15:46
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
## 2019-02-20 NOTE — XMS REPORT
[facility]

 Created on: 11/10/2015



Farhat Bond

External Reference #: [id number]

: 1970

Sex: Male



Demographics







 Address  [address] 

[place]  [address]

 

 Home Phone  [phone]

 

 Preferred Language  Unknown

 

 Marital Status  Unknown

 

 Jainism Affiliation  Unknown

 

 Race  White

 

 Ethnic Group  Not  or 





Author







 Author  Johanna See

 

 Organization  [employer]

 

 Address  Unknown

 

 Phone  Unavailable







Care Team Providers







 Care Team Member Name  Role  Phone

 

 Johanna See    Unavailable



                                                                



Allergies

          No Known Allergies                                                   
                                     



Problems

          





 Problem Type  Condition  Code  Onset Dates  Condition Status

 

 Problem  Human immunodeficiency virus (HIV) disease  042     Active

 

 Problem  Pain in soft tissues of limb  729.5     Active

 

 Problem  Nondependent tobacco use disorder  305.1     Active

 

 Problem  Hyperlipidemia  272.4     Active

 

 Problem  Smoker  305.1     Active

 

 Problem  Depression  F32.9     Active

 

 Problem  ED (erectile dysfunction)  607.84     Active

 

 Problem  Essential hypertension, benign  401.1     Active

 

 Problem  Depression  311     Active

 

 Problem  Hyperglycemia  790.29     Active



                                                                               
                                                                               
                    



Medications

          No Known Medications                                                 
                             



Results

          No Known Results                                                     
               



Summary Purpose

          [employer] Submission

## 2019-02-20 NOTE — XMS REPORT
Patient Summary (HL7 CCD)

 Created on: 2015



HOWARD PAZ

External Reference #: [id number]

: 1970

Sex: Male



Demographics







 Address  [address] 

[place]  [address]

 

 Home Phone  [phone]

 

 Preferred Language  English

 

 Marital Status  Unknown

 

 Quaker Affiliation  Unknown

 

 Race  White

 

 Ethnic Group  Not  or 





Author







 Author  LASHAY PARIKH

 

 Organization  Unknown

 

 Address  [address]

[place], [place]  [address]



 

 Phone  [phone]







Care Team Providers







 Care Team Member Name  Role  Phone

 

 PRO PHYS, NASIMA ER  Attphys  [phone]

 

 PRO PHYS, NASIMA ER  Prisurg  [phone]



                                            



Vital Signs

          Unknown or Not Available.                                            
                        



Allergies

          Unknown or Not Available.                                            
                                  



Procedures

          





 Procedure        Code        Procedure Type        Date    

 

 CBC W/ AUTO DIFF (RFLX MAN DIFF IF IND)        9148591        SNOMED CT        
2014    

 

 COMPREHENSIVE METABOLIC PANEL        328393655        SNOMED CT        2014    

 

 URINALYSIS C&S IF IND        635417441        SNOMED CT        2014    

 

 CULTURE BLOOD        55322576        SNOMED CT        2014    

 

 ^CBC W/AUTO DIFF        3327510        SNOMED CT        2014    

 

 CX CHEST 2 VIEWS        786343640        SNOMED CT        2014    



                                                                               
                                                 



History of Immunizations

          Unknown or Not Available.                                            
                                  



Problems

          Unknown or Not Available.                                            
                                            



Results

          







 COMPREHENSIVE METABOLIC PANEL - Collect Date/Time: 2014 13:40     

 

 Test Name        Code        Test Result        Test Units        Test Ref 
Range    

 

 GLUCOSE        2345-7        92        MG/DL        L=70       H=100    

 

 SODIUM        2951-2        140        MEQ/L        L=135      H=148    

 

 POTASSIUM        2823-3        4.1        MEQ/L        L=3.5      H=5.3    

 

 CHLORIDE        2075-0        105        MEQ/L        L=96       H=110    

 

 CO2        2028-9        22        MEQ/L        L=22       H=29    

 

 BUN        3094-0        8        MG/DL        L=8        H=22    

 

 CREATININE        2160-0        1.1        MG/DL        L=0.6      H=1.6    

 

 SGOT/AST        1920-8        20        IU/L        L=10       H=40    

 

 SGPT/ALT        1742-6        37        IU/L        L=8        H=54    

 

 ALK PHOS        6768-6        117        IU/L        L=35       H=115    

 

 TOTAL PROTEIN        2885-2        7.3        G/DL        L=5.5      H=8.5    

 

 ALBUMIN        1751-7        4.1        G/DL        L=3.1      H=5.4    

 

 TOTAL BILI        1975-2        2.0        MG/DL        L=0.0      H=1.5    

 

 CALCIUM        80241-0        9.9        MG/DL        L=8.2      H=10.6    

 

 AGE                 44        yrs             

 

 GFR NonAA                 72                      

 

 GFR AA                 87                      

 

 eGFR                 60        mL/min/1.7             

 

 eGFR AA*                 60        mL/min/1.7             











 CBC W/ AUTO DIFF (RFLX MAN DIFF IF IND) - Collect Date/Time: 2014 13:40 
    

 

 Test Name        Code        Test Result        Test Units        Test Ref 
Range    

 

 WBC        31208-9        10.9        TH/CMM        L=4.5      H=10.8    

 

 RBC        789-8        4.92        ML/CMM        L=4.70     H=6.10    

 

 HGB        718-7        15.6        G/DL        L=14.0     H=18.0    

 

 HCT        4544-3        45.0        %        L=42.0     H=52.0    

 

 MCV                 92        FL        L=81       H=99    

 

 MCH                 31.7        PG        L=27.0     H=33.0    

 

 MCHC                 34.7        G/DL        L=31.0     H=36.0    

 

 RDW SD                 41        FL        L=36       H=50    

 

 RDW CV                 12.3        %        L=0.0      H=14.8    

 

 MPV                 10.5        FL        L=9.3      H=12.5    

 

 PLT        777-3        197        TH/CMM        L=130      H=440    

 

 NRBC#                 0.00        TH/CMM        L=0.00     H=0.00    

 

 NRBC%                 0.0        /100WBC        L=0.0      H=2.0    

 

 %NEUT                 72.9        %             

 

 %LYMP                 16.1        %             

 

 %MONO                 9.1        %             

 

 %EOS                 1.7        %             

 

 %BASO                 0.2        %             

 

 #NEUT                 7.93        TH/CMM        L=2.10     H=8.20    

 

 #LYMP                 1.75        TH/CMM        L=0.90     H=5.20    

 

 #MONO                 0.99        TH/CMM        L=0.16     H=1.00    

 

 #EOS                 0.18        TH/CMM        L=0.00     H=0.80    

 

 #BASO                 0.02        TH/CMM        L=0.00     H=0.20    

 

 MANUAL DIFF                 NOT IND        N/A             











 URINALYSIS C&S IF IND - Collect Date/Time: 2014 13:30     

 

 Test Name        Code        Test Result        Test Units        Test Ref 
Range    

 

 COLOR                 YELLOW        N/A        NL: YELLOW    

 

 APPEARANCE                 CLEAR        N/A        NL: CLEAR    

 

 SPEC GRAV                 1.010        N/A        NL: 1.002 - 1.022    

 

 pH                 5.5        N/A        NL: 5 - 9    

 

 PROTEIN                 NEGATIVE        N/A        NL: NEGATIVE  mg/dl    

 

 GLUCOSE                 NEGATIVE        N/A        NL: NEGATIVE  mg/dl    

 

 KETONE                 NEGATIVE        N/A        NL: NEGATIVE  mg/dl    

 

 BILIRUBIN                 NEGATIVE        N/A        NL: NEGATIVE    

 

 BLOOD                 NEGATIVE        N/A        NL: NEGATIVE    

 

 NITRITE                 NEGATIVE        N/A        NL: NEGATIVE    

 

 LEUK SCREEN                 NEGATIVE        N/A        NL: NEGATIVE    

 

 WBC/HPF                 RARE        N/A        NL: NEGATIVE    

 

 RBC/HPF                 NEGATIVE        N/A        NL: NEGATIVE    

 

 CASTS/LPF                 NEGATIVE        N/A        NL: NEGATIVE    

 

 CRYSTALS                 TRACE AMORPH        N/A        NL: NEGATIVE    

 

 MUCOUS THRDS                 FEW        N/A        NL: NEGATIVE    

 

 BACTERIA                 FEW        N/A        NL: NEGATIVE    

 

 EPITH CELLS                 FEW SQUAMOUS        N/A        NL: NEGATIVE    

 

 TRICHOMONAS                 NEGATIVE        N/A        NL: NEGATIVE    

 

 YEAST                 NEGATIVE        N/A        NL: NEGATIVE    

 

 CULT SET UP?                 NO        N/A             



                                                                               
                   



Active Medications

          Unknown or Not Available.                                            
                        



Medications Administered During Visit

          Unknown or Not Available.                                            
                                  



Encounters

          





 Encounter Diagnosis        Diagnosis Code        Start Date    

 

 ACUTE URI NOS        4659        2014    



                                                                    



Social History

          





 Smoking Status        Code        Start Date        End Date    

 

 Current every day smoker        696020957                      



                                                                    



Patient Decision Aids

          Unknown or Not Available.                                            
              



Discharge Instructions

          





         



You were admitted to [facility] on 2014 with a principal 
diagnosis of ACUTE URI NOS.        



You were discharged from [facility] on 2014.        



Should you have any questions prior to discharge, please contact a member of 
your healthcare team. If you have left the hospital and have any questions, 
please contact your primary care physician.          



                                                          



Chief Complaint and Reason For Visit

          





 Chief Complaint        Date of Onset    

 

 COUGH CONGESTION FEVER             



                                                          



Function Status

          Unknown or Not Available.                                            
              



Referral/Transition of Care

          Unknown or Not Available.

## 2019-02-20 NOTE — XMS REPORT
[facility]

 Created on: 2015



Farhat Bond

External Reference #: [id number]

: 1970

Sex: Male



Demographics







 Address  [address] 

[place]  [address]

 

 Home Phone  [phone]

 

 Preferred Language  Unknown

 

 Marital Status  Unknown

 

 Sikhism Affiliation  Unknown

 

 Race  White

 

 Ethnic Group  Not  or 





Author







 Author  Annia Chen

 

 Organization  [employer]

 

 Address  Unknown

 

 Phone  Unavailable







Care Team Providers







 Care Team Member Name  Role  Phone

 

 Annia Chen  CP  Unavailable



                                                                



Allergies

          No Known Allergies                                                   
                                     



Problems

          





 Problem Type  Condition  ICD-9 Code  Onset Dates  Condition Status

 

 Problem  Long term (current) use of opiate analgesic  V58.69     Inactive

 

 Problem  Impotence of organic origin  607.84     Inactive

 

 Problem  Need for prophylactic vaccination against streptococcus pneumoniae (
pneumococcus)  V03.82     Inactive

 

 Problem  Unspecified infectious and parasitic diseases  136.9     Inactive

 

 Problem  Essential hypertension, benign  401.1     Active

 

 Problem  Pain in soft tissues of limb  729.5     Inactive

 

 Problem  Human immunodeficiency virus [HIV]  042     Inactive

 

 Problem  Need for prophylactic vaccination and inoculation, Influenza  V04.81 
    Inactive

 

 Problem  Unspecified sinusitis (chronic)  473.9     Inactive

 

 Problem  Nondependent tobacco use disorder  305.1     Active

 

 Problem  Screening examination for venereal disease  V74.5     Inactive



                                                                               
                                                                               
                              



Medications

          





 Medication  Code System  Code  Instructions  Start Date  End Date  Status  
Dosage

 

 Lisinopril  NDC  91303232775  20 Orally Once a day           2 tablet s



                                                                              



Results

          No Known Results                                                     
               



Summary Purpose

          [employer] Submission
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
## 2019-02-20 NOTE — XMS REPORT
[facility]

 Created on: 2018



Farhat Bond

External Reference #: [id number]

: 1970

Sex: Male



Demographics







 Address  [address] [place] [address]  [address]

 

 Preferred Language  Unknown

 

 Marital Status  Unknown

 

 Synagogue Affiliation  Unknown

 

 Race  Unknown

 

 Ethnic Group  Unknown





Author







 Author  Annia Chen

 

 Organization  [facility]

 

 Address  [address] [place] [address]  [id number]



 

 Phone  [phone]







Care Team Providers







 Care Team Member Name  Role  Phone

 

 Annia Chen  Unavailable  [phone]







PROBLEMS







 Type  Condition  ICD9-CM Code  PIO23-UX Code  Onset Dates  Condition Status  
SNOMED Code

 

 Problem  Benign essential hypertension     I10     Active  9828621

 

 Problem  Arthralgia of right ankle     M25.571     Active  086526741

 

 Problem  Depression     F32.9     Active  78181992

 

 Problem  Acquired immunodeficiency syndrome     B20     Active  28818650

 

 Problem  Mixed hyperlipidemia     E78.2     Active  237999241

 

 Problem  Generalized osteoarthritis of multiple sites     M15.9     Active  
438701402

 

 Problem  Bronchiolitis     J21.9     Active  8997789

 

 Problem  Cigarette smoker     F17.210     Active  99224221

 

 Problem  Erectile dysfunction, unspecified erectile dysfunction type     N52.9
     Active  586224303

 

 Problem  Other chronic pain     G89.29     Active  10160246

 

 Problem  Crushing injury of right foot, sequela     S97.81XS     Active  
72078639







ALLERGIES

No Information



ENCOUNTERS







 Encounter  Location  Date  Diagnosis

 

 [address] 
[id number]     

 

 [address] [address]  15 
May, 2018  Acquired immunodeficiency syndrome B20 and Human immunodeficiency 
virus (HIV) disease 042

 

 [address] 
[id number]    Acquired immunodeficiency syndrome B20

 

 [address] [address]     

 

 [address] [address]     

 

 [address] [address]     

 

 [facility]  [address] [place] [id number]    Acquired immunodeficiency syndrome B20 and Influenza vaccine needed 
Z23

 

 [facility]  [address] [place] [id number]  24 
Oct, 2017   

 

 [address] [address]  03 
Oct, 2017  Depression F32.9

 

 [address] [address]  24 
Aug, 2017   

 

 [address] 
[id number]  11 Aug, 2017  Acquired immune deficiency syndrome B20 ; Long term 
current use of opiate analgesic Z79.891 ; Benign essential hypertension I10 ; 
Mixed hyperlipidemia E78.2 ; Cigarette smoker F17.210 and Generalized 
osteoarthritis of multiple sites M15.9

 

 [address] 
[id number]  12 May, 2017  Acquired immune deficiency syndrome B20 ; Depression 
F32.9 ; Benign essential hypertension I10 ; Mixed hyperlipidemia E78.2 and 
Cigarette smoker F17.210

 

 [address] [address]     

 

 [address] 
[id number]  10 Feb, 2017  Acquired immune deficiency syndrome B20 ; Screening 
examination for sexually transmitted disease Z11.3 ; Pain in right ankle and 
joints of right foot M25.571 ; Other chronic pain G89.29 ; Bronchiolitis J21.9 
and Depression F32.9

 

 [facility]  [address] [place] [id number]  21 Oct, 2016  
Acquired immunodeficiency syndrome B20 ; Influenza vaccine needed Z23 ; 
Erectile dysfunction, unspecified erectile dysfunction type N52.9 ; Arthralgia 
of right ankle M25.571 and Crushing injury of right foot, sequela S97.81XS

 

 [address] [address]  08 
Sep, 2016   

 

 [address] [address]  08 
Sep, 2016   

 

 [address] 
[id number]    Acquired immunodeficiency syndrome B20

 

 [address] [place] 
[id number]  18 Mar, 2016  Acquired immune deficiency syndrome B20 ; Encounter 
for immunization Z23 ; Screening examination for sexually transmitted disease 
Z11.3 and Depression F32.9

 

 [facility]  [address] [place] [id number]     

 

 [address] [address]    Essential (primary) hypertension I10

 

 [facility] [address] 
[id number]  18 Dec, 2015  Human immunodeficiency virus (HIV) disease B20 and 
Smoking F17.200

 

 [address] [address]  10 
Nov, 2015   

 

 [address] [address]    Depression F32.9

 

 [address] [address]  24 
Sep, 2015  Hyperlipidemia 272.4

 

 [address] [address]  10 
Sep, 2015  Hyperlipidemia 272.4

 

 [address] 
[id number]  04 Sep, 2015  Human immunodeficiency virus (HIV) disease 042 ; Needs 
flu shot V04.81 ; Hyperlipidemia 272.4 ; Smoker 305.1 and Depression 311

 

 [address] [address]  12 
Aug, 2015   

 

 [address] [address]     

 

 [address] 
[id number]  15 May, 2015  Human immunodeficiency virus (HIV) disease 042 ; 
Nondependent tobacco use disorder 305.1 ; Essential hypertension, benign 401.1 
and Hyperglycemia 790.29

 

 [address] [address]     

 

 [address] [address]     

 

 [address] [place] [address]     

 

  [facility]  [address] [place] [facility] [address]     

 

 [facility]  [address] [place], [place] 
[id number]    Asymptomatic human immunodeficiency virus (HIV) 
infection status V08 ; Smoker 305.1 and Depression 311

 

 [facility]  [address] [place], [place] 
[id number]  12 Sep, 2014  Essential hypertension, benign 401.1 ; Nondependent 
tobacco use disorder 305.1 and HIV (human immunodeficiency virus infection) V08

 

 [facility] [place] [facility]  [address]  [place] [id number]  29 Aug, 2014 
  

 

 [facility] [place] [facility]  [address]  [place], [place] [id number]   
  

 

  [facility]  [address] [place] [facility] [address]     

 

 [facility]  [address] [place] [facility] [address]     

 

 [facility]  [address] [place] [facility] [address]  04 
Oct, 2013   

 

 [facility]  [address] [place] [facility] [address]     

 

 [facility]  [address] [place] [facility] [address]     

 

 [facility]  [address] [place] [facility] [address]     

 

 [facility]  [address] [place] [facility] [address]     

 

 [facility]  [address] [place] [facility] [address]  14 
Sep, 2012   

 

 [facility]  [address] [place] [facility] [address]  03 
Aug, 2012   

 

 [facility]  [address] [place] [facility] [address]  30 
Mar, 2012   

 

 [facility]  [address] [place] [facility] [address]     

 

 [facility] [place] [facility]  [address]  [place], [place] [id number]  07 Oct, 2011 
  







IMMUNIZATIONS

No Known Immunizations



SOCIAL HISTORY

Never Assessed



REASON FOR VISIT

refills



PLAN OF CARE





VITAL SIGNS





MEDICATIONS







 Medication  Instructions  Dosage  Frequency  Start Date  End Date  Duration  
Status

 

 Evotaz 300/150 mg  Oral Daily  1  24h  15 May, 2015     30 days  Active

 

 Descovy 200-25 mg  Orally Once a day  1 Tablet  24h       30 days  
Active







RESULTS

No Results



PROCEDURES

No Known procedures



INSTRUCTIONS





MEDICATIONS ADMINISTERED

No Known Medications



MEDICAL (GENERAL) HISTORY







 Type  Description  Date

 

 Medical History  Benign essential HTN , (Desc:Benign essential hypertension) ;
   

 

 Medical History  Acquired immune deficiency syndrome ;   

 

 Medical History  Smoking ;   

 

 Medical History  Human immunodeficiency virus (HIV) disease   

 

 Medical History  Nondependent tobacco use disorder   

 

 Medical History  Essential hypertension, benign   

 

 Medical History  Pain in soft tissues of limb   

 

 Medical History  Hyperglycemia   

 

 Medical History  ED (erectile dysfunction)   

 

 Medical History  Hyperlipidemia   

 

 Medical History  Smoker   

 

 Medical History  Depression   

 

 Surgical History  R fibula fx  2007
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
## 2019-02-20 NOTE — XMS REPORT
[facility]

 Created on: 2016



Farhat Bond

External Reference #: [id number]

: 1970

Sex: Male



Demographics







 Address  [address] 

[place]  [address]

 

 Home Phone  [phone]

 

 Preferred Language  Unknown

 

 Marital Status  Unknown

 

 Jewish Affiliation  Unknown

 

 Race  White

 

 Ethnic Group  Not  or 





Author







 Author  Johanna See

 

 Organization  [employer]

 

 Address  Unknown

 

 Phone  Unavailable







Care Team Providers







 Care Team Member Name  Role  Phone

 

 Johanna See    Unavailable



                                                                



Allergies

          No Known Allergies                                                   
                                     



Problems

          





 Problem Type  Condition  Code  Onset Dates  Condition Status

 

 Problem  Acquired immunodeficiency syndrome  B20     Active

 

 Problem  Smoking  F17.200     Active

 

 Problem  Depression  F32.9     Active

 

 Assessment  Essential (primary) hypertension  I10     Active

 

 Problem  Benign essential hypertension  I10     Active

 

 Problem  Mixed hyperlipidemia  E78.2     Active



                                                                               
                                                           



Medications

          





 Medication  Code System  Code  Instructions  Start Date  End Date  Status  
Dosage

 

 Ibuprofen  NDC  89813335388  800MG             TAKE ONE TABLET BY MOUTH THREE 
TIMES DAILY AS NEEDED

 

 Lisinopril  NDC  61563164221  20 Orally Once a day           2 tablet s



                                                                               
         



Results

          No Known Results                                                     
               



Summary Purpose

          [employer] Submission

## 2019-02-20 NOTE — XMS REPORT
[facility]

 Created on: 2015



Farhat Bond

External Reference #: [id number]

: 1970

Sex: Male



Demographics







 Address  [address] 

[place]  [address]

 

 Home Phone  [phone]

 

 Preferred Language  Unknown

 

 Marital Status  Unknown

 

 Scientologist Affiliation  Unknown

 

 Race  White

 

 Ethnic Group  Not  or 





Author







 Author  Johanna See

 

 [facility]  [employer]

 

 Address  Unknown

 

 Phone  Unavailable







Care Team Providers







 Care Team Member Name  Role  Phone

 

 Johanna See    Unavailable



                                                                



Allergies

          No Known Allergies                                                   
                                     



Problems

          





 Problem Type  Condition  ICD-9 Code  Onset Dates  Condition Status

 

 Problem  ED (erectile dysfunction)  607.84     Active

 

 Problem  Essential hypertension, benign  401.1     Active

 

 Problem  Hyperglycemia  790.29     Active

 

 Problem  Human immunodeficiency virus (HIV) disease  042     Active

 

 Problem  Pain in soft tissues of limb  729.5     Active

 

 Problem  Nondependent tobacco use disorder  305.1     Active



                                                                               
                                                           



Medications

          





 Medication  Code System  Code  Instructions  Start Date  End Date  Status  
Dosage

 

 Cialis  NDC  [address]-47  20 MG Orally every 72 hrs  May 17, 2013        1 
tablet as needed



                                                                              



Results

          No Known Results                                                     
               



Summary Purpose

          [employer]ClinicalWorks Submission
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
## 2019-02-20 NOTE — XMS REPORT
[facility]

 Created on: 10/03/2017



RondaFarhat

External Reference #: [id number]

: 1970

Sex: Male



Demographics







 Address  [address]  [address]

 

 Preferred Language  Unknown

 

 Marital Status  Unknown

 

 Synagogue Affiliation  Unknown

 

 Race  Unknown

 

 Ethnic Group  Unknown





Author







 Author  Annia Chen

 

 [facility]

 

 Address  [address] [place], [place]  [address]



 

 Phone  [phone]







Care Team Providers







 Care Team Member Name  Role  Phone

 

 Annia Chen  Unavailable  [phone]







PROBLEMS







 Type  Condition  ICD9-CM Code  OGN03-ME Code  Onset Dates  Condition Status  
SNOMED Code

 

 Problem  Acquired immunodeficiency syndrome     B20     Active  50765866

 

 Problem  Cigarette smoker     F17.210     Active  62853213

 

 Problem  Depression     F32.9     Active  83304278

 

 Problem  Mixed hyperlipidemia     E78.2     Active  134792249

 

 Problem  Benign essential hypertension     I10     Active  2153007

 

 Problem  Generalized osteoarthritis of multiple sites     M15.9     Active  
703405680

 

 Problem  Other chronic pain     G89.29     Active  90336670

 

 Problem  Crushing injury of right foot, sequela     S97.81XS     Active  
49842062

 

 Problem  Arthralgia of right ankle     M25.571     Active  358829336

 

 Problem  Bronchiolitis     J21.9     Active  6060778

 

 Problem  Erectile dysfunction, unspecified erectile dysfunction type     N52.9
     Active  811795640







ALLERGIES

No Information



SOCIAL HISTORY

Never Assessed



PLAN OF CARE





VITAL SIGNS





MEDICATIONS







 Medication  Instructions  Dosage  Frequency  Start Date  End Date  Duration  
Status

 

 Zoloft 100 MG  Orally Once a day  1 tablet  24h       30 days  
Active







RESULTS

No Results



PROCEDURES

No Known procedures



IMMUNIZATIONS

No Known Immunizations



MEDICAL (GENERAL) HISTORY







 Type  Description  Date

 

 Medical History  Benign essential HTN , (Desc:Benign essential hypertension) ;
   

 

 Medical History  Acquired immune deficiency syndrome ;   

 

 Medical History  Smoking ;   

 

 Medical History  Human immunodeficiency virus (HIV) disease   

 

 Medical History  Nondependent tobacco use disorder   

 

 Medical History  Essential hypertension, benign   

 

 Medical History  Pain in soft tissues of limb   

 

 Medical History  Hyperglycemia   

 

 Medical History  ED (erectile dysfunction)   

 

 Medical History  Hyperlipidemia   

 

 Medical History  Smoker   

 

 Medical History  Depression   

 

 Surgical History  R fibula fx  2007

## 2019-02-20 NOTE — XMS REPORT
[facility]

 Created on: 05/15/2017



Ronda Farhat

External Reference #: [id number]

: 1970

Sex: Male



Demographics







 Address  [address] [place] [address]  [address]

 

 Preferred Language  Unknown

 

 Marital Status  Unknown

 

 Adventist Affiliation  Unknown

 

 Race  Unknown

 

 Ethnic Group  Unknown





Author







 Author  Johanna See

 

 [facility]

 

 Address  [address] [place], [place]  [address]



 

 Phone  [phone]







Care Team Providers







 Care Team Member Name  Role  Phone

 

 Johanna See  Unavailable  [phone]







PROBLEMS







 Type  Condition  ICD9-CM Code  ROA44-AJ Code  Onset Dates  Condition Status  
SNOMED Code

 

 Problem  Benign essential hypertension     I10     Active  9542884

 

 Problem  Depression     F32.9     Active  75664362

 

 Problem  Acquired immunodeficiency syndrome     B20     Active  56398696

 

 Assessment  Acquired immune deficiency syndrome     B20  12 May, 2017  Active  
85828072

 

 Problem  Mixed hyperlipidemia     E78.2     stable  647978441

 

 Problem  Other chronic pain     G89.29     Active  28669189

 

 Problem  Bronchiolitis     J21.9     Active  8447962

 

 Problem  Arthralgia of right ankle     M25.571     Active  938355677

 

 Problem  Cigarette smoker     F17.210     Active  17321818

 

 Problem  Erectile dysfunction, unspecified erectile dysfunction type     N52.9
     well-controlled  564511635

 

 Problem  Crushing injury of right foot, sequela     S97.81XS     well-
controlled  69560569







ALLERGIES

Unknown Allergies



SOCIAL HISTORY

No smoking Hx information available



PLAN OF CARE







 Activity  Details









  









 Pending Test  Human Immunodeficiency Virus (HIV-1), Quantitative, Real-time 
PCR (graph) 98985 

 

 Pending Test  Rapid Plasma Reagin (RPR), Test w/ Reflex to Quant RPR/Confirm 
Treponema pallidum Antibodies 67960 

 

 Pending Test  Lipid Panel 11298 

 

 Pending Test  Metabolic Panel (14), Comprehensive (CMP) 58495 

 

 Pending Test  CD4/CD8 Ratio Profile 35559 

 

    3 Months,Reason:



     



VITAL SIGNS







 Height  72 in  2017

 

 Weight  212 lbs  2017

 

 Temperature  98.2 degrees Fahrenheit  2017

 

 Heart Rate  93 /min  2017

 

 Respiratory Rate  16 /min  2017

 

 Oximetry  96 %  2017

 

 BMI  28.75 kg/m2  2017

 

 Blood pressure systolic  114 mm Hg  2017

 

 Blood pressure diastolic  76 mm Hg  2017







MEDICATIONS







 Medication  Instructions  Dosage  Frequency  Start Date  End Date  Duration  
Status

 

 Zoloft 100 MG  Orally Once a day  1 tablet  24h       30 days  
Active

 

 Cialis 20 MG  Orally every 72 hrs  1 tablet as needed     17 May, 2013     30 
days  Active

 

 Pravastatin Sodium 40 MG  Orally Once a day  1 tablet  24h       
30 day(s)  Active

 

 Voltaren 1 %  Transdermal four times a day  2-4 gm apply to right foot/ankle  
6h  21 Oct, 2016     30 days  Active

 

 Evotaz 300/150 mg  Oral Daily  1  24h  15 May, 2015     30 days  Active

 

 Ibuprofen 800     TAKE ONE TABLET BY MOUTH THREE TIMES DAILY AS NEEDED        
   30  Active

 

 Ventolin  (90 Base) MCG/ACT  Inhalation every 4 hrs  2 puffs as needed  
4h  10 Feb, 2017     30 days  Active

 

 Celebrex 200 MG  Orally Once a day  1 capsule  24h  10 Feb, 2017     30 day(s)
  Active

 

 Descovy 200-25 mg  Orally Once a day  1 Tablet  24h       30 days  
Active

 

 Lisinopril 40     TAKE ONE TABLET BY MOUTH DAILY           30  Active

 

 Wellbutrin  MG     TAKE ONE TABLET BY MOUTH TWICE A DAY           30  
Active







RESULTS

No Results



PROCEDURES







 Procedure  Date Ordered  Related Diagnosis  Body Site

 

 COMPREHEN METABOLIC PANEL  May 12, 2017      

 

 T CELL, ABSOLUTE COUNT/RATIO  May 12, 2017      

 

 LIPID PANEL SO  May 12, 2017      

 

 HIV-1, DNA, QUANT  May 12, 2017      

 

 Office Visit, Est Pt., Level 4  May 12, 2017      

 

 BLOOD SEROLOGY, QUALITATIVE  May 12, 2017      







IMMUNIZATIONS

No Known Immunizations
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
## 2019-02-20 NOTE — XMS REPORT
[facility]

 Created on: 2016



Farhat Bond

External Reference #: [id number]

: 1970

Sex: Male



Demographics







 Address  [address]

[place]  [address]

 

 Home Phone  [phone]

 

 Preferred Language  Unknown

 

 Marital Status  Unknown

 

 Voodoo Affiliation  Unknown

 

 Race  White

 

 Ethnic Group  Not  or 





Author







 Author  YOMI MURRIETA

 

 Organization  [employer]

 

 Address  Unknown

 

 Phone  Unavailable







Care Team Providers







 Care Team Member Name  Role  Phone

 

 YOMI MURRIETA  CP  Unavailable



                                                                



Allergies

          No Known Allergies                                                   
                                     



Problems

          





 Problem Type  Condition  Code  Onset Dates  Condition Status

 

 Problem  HIV (human immunodeficiency virus infection)  Z21     Active

 

 Problem  Essential hypertension  I10     Active

 

 Problem  Tobacco use  Z72.0     Active

 

 Problem  Hyperlipidemia, unspecified hyperlipidemia type  E78.5     Active

 

 Problem  Moderate single current episode of major depressive disorder  F32.1  
   Active



                                                                               
                                                 



Medications

          No Known Medications                                                 
                             



Results

          No Known Results                                                     
               



Summary Purpose

          [employer] Submission

## 2019-02-20 NOTE — XMS REPORT
[facility]

 Created on: 2015



Farhat Bond

External Reference #: [id number]

: 1970

Sex: Male



Demographics







 Address  [address] 

[place]  [address]

 

 Home Phone  [phone]

 

 Preferred Language  Unknown

 

 Marital Status  Unknown

 

 Jain Affiliation  Unknown

 

 Race  White

 

 Ethnic Group  Not  or 





Author







 Author  Annia Chen

 

 Organization  [employer]

 

 Address  Unknown

 

 Phone  Unavailable







Care Team Providers







 Care Team Member Name  Role  Phone

 

 Annia Chen  CP  Unavailable



                                                                



Allergies

          No Known Allergies                                                   
                                     



Problems

          





 Problem Type  Condition  ICD-9 Code  Onset Dates  Condition Status

 

 Problem  Long term (current) use of opiate analgesic  V58.69     Inactive

 

 Problem  Impotence of organic origin  607.84     Inactive

 

 Problem  Need for prophylactic vaccination against streptococcus pneumoniae (
pneumococcus)  V03.82     Inactive

 

 Problem  Unspecified infectious and parasitic diseases  136.9     Inactive

 

 Problem  Essential hypertension, benign  401.1     Active

 

 Problem  Pain in soft tissues of limb  729.5     Inactive

 

 Problem  Human immunodeficiency virus [HIV]  042     Inactive

 

 Problem  Need for prophylactic vaccination and inoculation, Influenza  V04.81 
    Inactive

 

 Problem  Unspecified sinusitis (chronic)  473.9     Inactive

 

 Problem  Nondependent tobacco use disorder  305.1     Active

 

 Problem  Screening examination for venereal disease  V74.5     Inactive



                                                                               
                                                                               
                              



Medications

          





 Medication  Code System  Code  Instructions  Start Date  End Date  Status  
Dosage

 

 Pravastatin Sodium  NDC  [address]93[address]-10  40 MG Orally QHS  2015        
1 tablet



                                                                              



Results

          No Known Results                                                     
               



Summary Purpose

          eClinicalWorks Submission

## 2019-02-20 NOTE — XMS REPORT
[facility]

 Created on: 2017



Farhat Bond

External Reference #: [id number]

: 1970

Sex: Male



Demographics







 Address  [address] [place] [address]  [address]

 

 Preferred Language  Unknown

 

 Marital Status  Unknown

 

 Alevism Affiliation  Unknown

 

 Race  Unknown

 

 Ethnic Group  Unknown





Author







 Author  Johanna See

 

 [facility]

 

 Address  [address] [place], [place]  [id number]



 

 Phone  [phone]







Care Team Providers







 Care Team Member Name  Role  Phone

 

 Johanna See  Unavailable  [phone]







PROBLEMS







 Type  Condition  ICD9-CM Code  NZN17-NL Code  Onset Dates  Condition Status  
SNOMED Code

 

 Problem  Benign essential hypertension     I10     Active  9512173

 

 Problem  Depression     F32.9     Active  83663012

 

 Problem  Acquired immunodeficiency syndrome     B20     Active  14789224

 

 Problem  Mixed hyperlipidemia     E78.2     stable  097151156

 

 Problem  Other chronic pain     G89.29     Active  24096131

 

 Problem  Bronchiolitis     J21.9     Active  0263267

 

 Problem  Arthralgia of right ankle     M25.571     Active  781998369

 

 Problem  Cigarette smoker     F17.210     Active  44650356

 

 Problem  Erectile dysfunction, unspecified erectile dysfunction type     N52.9
     well-controlled  743937171

 

 Problem  Crushing injury of right foot, sequela     S97.81XS     well-
controlled  39517010







ALLERGIES

Unknown Allergies



SOCIAL HISTORY

No smoking Hx information available



PLAN OF CARE





VITAL SIGNS





MEDICATIONS

Unknown Medications



RESULTS

No Results



PROCEDURES

No Known procedures



IMMUNIZATIONS

No Known Immunizations

## 2019-02-20 NOTE — XMS REPORT
[facility]

 Created on: 2015



AimeesFarhat

External Reference #: [id number]

: 1970

Sex: Male



Demographics







 Address  [address] 

[place]  [address]

 

 Home Phone  [phone]

 

 Preferred Language  Unknown

 

 Marital Status  Unknown

 

 Yarsanism Affiliation  Unknown

 

 Race  White

 

 Ethnic Group  Not  or 





Author







 Author  Annia Chen

 

 [facility]  [employer]

 

 Address  Unknown

 

 Phone  Unavailable







Care Team Providers







 Care Team Member Name  Role  Phone

 

 Annia Chen  CP  Unavailable



                                                                



Allergies, Adverse Reactions, Alerts

          





 Substance  Reaction  Event Type

 

 N.K.D.A.  Info Not Available  Non Drug Allergy



                                                                               
         



Problems

          





 Problem Type  Condition  Code  Onset Dates  Condition Status

 

 Assessment  Human immunodeficiency virus (HIV) disease  042     Active

 

 Problem  Nondependent tobacco use disorder  305.1     Active

 

 Problem  Human immunodeficiency virus (HIV) disease  042     Active

 

 Problem  Smoker  305.1     Active

 

 Problem  Depression  311     Active

 

 Problem  Hyperlipidemia  272.4     Active

 

 Problem  Essential hypertension, benign  401.1     Active

 

 Problem  Pain in soft tissues of limb  729.5     Active

 

 Problem  Hyperglycemia  790.29     Active

 

 Problem  ED (erectile dysfunction)  607.84     Active

 

 Assessment  Depression  311     Active

 

 Assessment  Smoker  305.1     Active

 

 Assessment  Hyperlipidemia  272.4     Active

 

 Assessment  Needs flu shot  V04.81     Active



                                                                               
                                                                               
                                                            



Medications

          





 Medication  Code System  Code  Instructions  Start Date  End Date  Status  
Dosage

 

 Lisinopril  NDC  61772994712  20 Orally Once a day           2 tablet s

 

 Pravastatin Sodium  NDC  [address]-10  40 MG Orally QHS  2015        
1 tablet

 

 Wellbutrin SR  NDC  [address]-01  150 MG Orally 1 tab qd x 7 days then BID  
2015        1 tablet (stop smoking 1-2 weeks after starting meds)

 

 Ibuprofen  NDC  46949221924  800             TAKE ONE TABLET BY MOUTH THREE 
TIMES A DAY AS NEEDED

 

 Cialis  NDC  [address]-12  20 MG Orally every 72 hrs  May 17, 2013        1 
tablet as needed

 

 Zoloft  NDC  [address]-11  100 MG Orally Once a day  2015        1 
tablet

 

 Evotaz  NDC  7758-7037-68  300/150 mg Oral Daily  May 15, 2015        1

 

 Truvada  NDC  07698222113  200-300             TAKE ONE TABLET BY MOUTH EVERY 
DAY



                                                                               
                                                                               



Procedures

          





 Procedure  Coding System  Code  Date

 

 COMPREHEN METABOLIC PANEL  CPT-4  28661  2015

 

 LIPID PANEL SO  CPT-4  97633  2015

 

 T CELL, ABSOLUTE COUNT/RATIO  CPT-4  31901  2015

 

 VENIPUNCT, ROUTINE*  CPT-4  89328  2015

 

 COMPLETE BLOOD COUNT W/AUTO DIFF  CPT-4  46829  2015

 

 IMMUNIZATION ADMIN  CPT-4  38209  2015

 

 Flu vaccine no Preserv 3 and >  CPT-4  02295  2015

 

 Councel > 10 Min in SX Smoker  CPT-4  35739  2015

 

 HIV-1, DNA, QUANT  CPT-4  91122  2015

 

 Office Visit, Est Pt., Level 3  CPT-4  27524  2015



                                                                               
                                                                               
                              



Vital Signs

          





 Date/Time:  2015

 

 Temperature  98 F

 

 Weight  214.8 lbs

 

 Height  72 in

 

 Respiratory Rate  20 /min

 

 Cardiac Monitoring Heart Rate  92 /min

 

 Blood Pressure Diastolic  78 mm Hg

 

 Blood Pressure Systolic  118 mm Hg

 

 BMI  29.13 Index



                                                                    



Results

          





 Name  Result  Date  Reference Range  Unit  Abnormality Flag

 

 Human Immunodeficiency Virus (HIV-1), Quantitative, Real-time PCR (graph) 
47067               



                                                                               
         



Immunizations

          





 Vaccine  Administration Date

 

 Flu vaccine no Preserv 3 and >  2015



                                                                    



Summary Purpose

          eClinicalWorks Submission

## 2019-02-20 NOTE — XMS REPORT
[facility]

 Created on: 2015



Farhat Bond

External Reference #: [id number]

: 1970

Sex: Male



Demographics







 Address  [address] 

[place]  [address]

 

 Home Phone  [phone]

 

 Preferred Language  Unknown

 

 Marital Status  Unknown

 

 Mandaeism Affiliation  Unknown

 

 Race  White

 

 Ethnic Group  Not  or 





Author







 Author  Johanna See

 

 Organization  [employer]

 

 Address  Unknown

 

 Phone  Unavailable







Care Team Providers







 Care Team Member Name  Role  Phone

 

 Johanna See    Unavailable



                                                                



Allergies

          No Known Allergies                                                   
                                     



Problems

          





 Problem Type  Condition  ICD-9 Code  Onset Dates  Condition Status

 

 Assessment  Hyperlipidemia  272.4     Active

 

 Problem  Nondependent tobacco use disorder  305.1     Active

 

 Problem  Human immunodeficiency virus (HIV) disease  042     Active

 

 Problem  Smoker  305.1     Active

 

 Problem  Depression  311     Active

 

 Problem  Hyperlipidemia  272.4     Active

 

 Problem  Essential hypertension, benign  401.1     Active

 

 Problem  Pain in soft tissues of limb  729.5     Active

 

 Problem  Hyperglycemia  790.29     Active

 

 Problem  ED (erectile dysfunction)  607.84     Active



                                                                               
                                                                               
                    



Medications

          





 Medication  Code System  Code  Instructions  Start Date  End Date  Status  
Dosage

 

 Pravastatin Sodium  NDC  [address]-10  40 MG Orally Once a day  2015 
       1 tablet



                                                                              



Results

          No Known Results                                                     
               



Summary Purpose

          eClinicalWorks Submission

## 2019-02-20 NOTE — XMS REPORT
[facility]

 Created on: 10/01/2018



Aimees, Farhat

External Reference #: [id number]

: 1970

Sex: Male



Demographics







 Address  [address] [place] [address]  [address]

 

 Preferred Language  Unknown

 

 Marital Status  Unknown

 

 Caodaism Affiliation  Unknown

 

 Race  Unknown

 

 Ethnic Group  Unknown





Author







 Author  Lana Schwartz

 

 [facility]

 

 Address  [address] [place] [address]  [id number]



 

 Phone  [phone]







Care Team Providers







 Care Team Member Name  Role  Phone

 

 Lana Schwartz  Unavailable  [phone]







PROBLEMS







 Type  Condition  ICD9-CM Code  KZA07-XA Code  Onset Dates  Condition Status  
SNOMED Code

 

 Problem  Benign essential hypertension     I10     Active  3935638

 

 Problem  Arthralgia of right ankle     M25.571     Active  329072035

 

 Problem  Depression     F32.9     Active  70654356

 

 Problem  Acquired immunodeficiency syndrome     B20     Active  96175190

 

 Problem  Mixed hyperlipidemia     E78.2     Active  364989034

 

 Problem  Generalized osteoarthritis of multiple sites     M15.9     Active  
943241190

 

 Problem  Bronchiolitis     J21.9     Active  2691380

 

 Problem  Cigarette smoker     F17.210     Active  23606716

 

 Problem  Erectile dysfunction, unspecified erectile dysfunction type     N52.9
     Active  516450702

 

 Problem  Other chronic pain     G89.29     Active  52647402

 

 Problem  Crushing injury of right foot, sequela     S97.81XS     Active  
24469716







ALLERGIES

No Known Allergies



ENCOUNTERS







 Encounter  Location  Date  Diagnosis

 

 [facility]  [address] [place], [place] 
[id number]  28 Sep, 2018  Acquired immunodeficiency syndrome B20 and Influenza 
vaccine needed Z23

 

 [address] [address]     

 

 [address] [address]  15 
May, 2018  Acquired immunodeficiency syndrome B20 and Human immunodeficiency 
virus (HIV) disease 042

 

 [facility]  [address] [place] [address] 
[id number]    Acquired immunodeficiency syndrome B20

 

 [address] [address]     

 

 [address] [address]     

 

 [address] [place] [address]     

 

 [facility]  [address] [place] [id number]    Acquired immunodeficiency syndrome B20 and Influenza vaccine needed 
Z23

 

 [facility]  [address] [place] [id number]  24 
Oct, 2017   

 

 [address] [address]  03 
Oct, 2017  Depression F32.9

 

 [address] [address]  24 
Aug, 2017   

 

 [facility]  [address] [place] [address] 
[id number]  11 Aug, 2017  Acquired immune deficiency syndrome B20 ; Long term 
current use of opiate analgesic Z79.891 ; Benign essential hypertension I10 ; 
Mixed hyperlipidemia E78.2 ; Cigarette smoker F17.210 and Generalized 
osteoarthritis of multiple sites M15.9

 

 [address] 
[id number]  12 May, 2017  Acquired immune deficiency syndrome B20 ; Depression 
F32.9 ; Benign essential hypertension I10 ; Mixed hyperlipidemia E78.2 and 
Cigarette smoker F17.210

 

 [address] [address]     

 

 [address] 
[id number]  10 Feb, 2017  Acquired immune deficiency syndrome B20 ; Screening 
examination for sexually transmitted disease Z11.3 ; Pain in right ankle and 
joints of right foot M25.571 ; Other chronic pain G89.29 ; Bronchiolitis J21.9 
and Depression F32.9

 

 [facility]  [address] [place], [place] [id number]  21 Oct, 2016  
Acquired immunodeficiency syndrome B20 ; Influenza vaccine needed Z23 ; 
Erectile dysfunction, unspecified erectile dysfunction type N52.9 ; Arthralgia 
of right ankle M25.571 and Crushing injury of right foot, sequela S97.81XS

 

 [address] [address]  08 
Sep, 2016   

 

 [address] [address]  08 
Sep, 2016   

 

 [facility] [address] 
[id number]    Acquired immunodeficiency syndrome B20

 

 [facility] [address] 
[id number]  18 Mar, 2016  Acquired immune deficiency syndrome B20 ; Encounter 
for immunization Z23 ; Screening examination for sexually transmitted disease 
Z11.3 and Depression F32.9

 

 [facility]  [address] [place] [id number]     

 

 [address] [address]    Essential (primary) hypertension I10

 

 [facility] [address] 
[id number]  18 Dec, 2015  Human immunodeficiency virus (HIV) disease B20 and 
Smoking F17.200

 

 [address] [address]  10 
Nov, 2015   

 

 [address] [address]    Depression F32.9

 

 [address] [address]  24 
Sep, 2015  Hyperlipidemia 272.4

 

 [address] [address]  10 
Sep, 2015  Hyperlipidemia 272.4

 

 [address] 
[id number]  04 Sep, 2015  Human immunodeficiency virus (HIV) disease 042 ; Needs 
flu shot V04.81 ; Hyperlipidemia 272.4 ; Smoker 305.1 and Depression 311

 

 [address] [address]  12 
Aug, 2015   

 

 [address] [address]     

 

 [facility] [address] 
[id number]  15 May, 2015  Human immunodeficiency virus (HIV) disease 042 ; 
Nondependent tobacco use disorder 305.1 ; Essential hypertension, benign 401.1 
and Hyperglycemia 790.29

 

 [address] [address]     

 

 [place] [address] [place] [facility] [place] [address]     

 

 [facility]  [address] [place] [facility] [address]     

 

 [facility]  [address] [place] [facility] [address]     

 

 [facility]  [address] [place], [place] 
[id number]    Asymptomatic human immunodeficiency virus (HIV) 
infection status V08 ; Smoker 305.1 and Depression 311

 

 [facility]  [address] [place], [place] 
[id number]  12 Sep, 2014  Essential hypertension, benign 401.1 ; Nondependent 
tobacco use disorder 305.1 and HIV (human immunodeficiency virus infection) V08

 

 [facility] [place] [facility]  [address] [facility] [address]  [place] [id number]  29 Aug, 2014 
  

 

 [facility] [place] [facility]  [address] [facility] [address]  [place] [id number]   
  

 

  [facility]  [address] [place] [facility] [address]     

 

 [facility]  [address] [place] [facility] [address]     

 

  [facility]  [address] [place] [facility] [address]  04 
Oct, 2013   

 

  [facility]  [address] [place] [facility] [address]     

 

 [facility]  [address] [place] [facility] [address]     

 

 [facility]  [address] [place] [facility] [address]     

 

 [facility]  [address] [place] [facility] [address]     

 

 [facility]  [address] [place] [facility] [address]  14 
Sep, 2012   

 

 [facility]  [address] [place] [facility] [address]  03 
Aug, 2012   

 

 [facility]  [address] [place] [facility] [address]  30 
Mar, 2012   

 

 [facility]  [address] [place] [address]     

 

 [facility]  [address] [facility] [address]  [place] [id number]  07 Oct, 2011 
  







IMMUNIZATIONS







 Vaccine  Route  Administration Date  Status

 

 Influenza Split 3 yrs > (QUAD)  IM Intramuscular  2018  Administered







SOCIAL HISTORY

Never Assessed



REASON FOR VISIT

HIV f/u



PLAN OF CARE







 Activity  Details









  









 Follow Up  4 Months Reason:







VITAL SIGNS







 Height  72 in  2018

 

 Weight  214 lbs  2018

 

 Temperature  98.3 degrees Fahrenheit  2018

 

 Heart Rate  73 /min  2018

 

 Respiratory Rate  18 /min  2018

 

 Oximetry  98 %  2018

 

 BMI  29.02 kg/m2  2018

 

 Blood pressure systolic  138 mm Hg  2018

 

 Blood pressure diastolic  92 mm Hg  2018







MEDICATIONS







 Medication  Instructions  Dosage  Frequency  Start Date  End Date  Duration  
Status

 

 Norco  MG  Orally every 12 hrs  1 tablet as needed  12h  11 Aug, 2017   
  30 days  Active

 

 Voltaren 1 %  Transdermal four times a day  2-4 gm apply to right foot/ankle  
6h  21 Oct, 2016     30 days  Active

 

 Cialis 20 MG  Orally every 72 hrs  1 tablet as needed     17 May, 2013     30 
days  Active

 

 Ventolin  (90 Base) MCG/ACT     INHALE TWO PUFFS BY MOUTH EVERY 4 HOURS 
AS NEEDED           16  Active

 

 Ibuprofen 800 mg     TAKE ONE TABLET BY MOUTH THREE TIMES A DAY AS NEEDED     
      30  Active

 

 Celebrex 200  Orally Once a day  1 capsule  24h        30  Active

 

 Pravastatin Sodium 40 MG  Orally Once a day  1 tablet  24h       
30 day(s)  Active

 

 Lisinopril 40     TAKE ONE TABLET BY MOUTH DAILY           30  Active

 

 Zoloft 100 MG  Orally Once a day  1 tablet  24h  30 2015     30 days  
Active

 

 Descovy 200-25 mg  Orally Once a day  1 Tablet  24h  08 2016     30 days  
Active

 

 Evotaz 300/150 mg  Oral Daily  1  24h  15 May, 2015     30 days  Active







RESULTS

No Results



PROCEDURES







 Procedure  Date Ordered  Result  Body Site

 

 T CELL, ABSOLUTE COUNT/RATIO  2018      

 

 COMPREHEN METABOLIC PANEL  2018      

 

 HIV-1, DNA, QUANT  2018      

 

 IMMUNIZATION ADMIN  2018      

 

 FLU VAC NO PRSV 4 SUNI 3 YRS+  2018      







INSTRUCTIONS





MEDICATIONS ADMINISTERED

No Known Medications



MEDICAL (GENERAL) HISTORY







 Type  Description  Date

 

 Medical History  Benign essential HTN , (Desc:Benign essential hypertension) ;
   

 

 Medical History  Acquired immune deficiency syndrome ;   

 

 Medical History  Smoking ;   

 

 Medical History  Human immunodeficiency virus (HIV) disease   

 

 Medical History  Nondependent tobacco use disorder   

 

 Medical History  Essential hypertension, benign   

 

 Medical History  Pain in soft tissues of limb   

 

 Medical History  Hyperglycemia   

 

 Medical History  ED (erectile dysfunction)   

 

 Medical History  Hyperlipidemia   

 

 Medical History  Smoker   

 

 Medical History  Depression   

 

 Surgical History  R fibula fx  2007

## 2019-02-20 NOTE — XMS REPORT
Continuity of Care Document

 Created on: 2019



HOWARD PAZ

External Reference #: [id number]

: 1970

Sex: Male



Demographics







 Address  [address]

[place]  [address]

 

 Home Phone  [phone] x

 

 Preferred Language  Unknown

 

 Marital Status  Unknown

 

 Confucianism Affiliation  Unknown

 

 Race  Unknown

 

 Ethnic Group  Unknown





Author







 Author  [facility]

 

 Organization  [facility]

 

 Address  Unknown

 

 Phone  Unavailable



              



Allergies

      





 Active            Description            Code            Type            
Severity            Reaction            Onset            Reported/Identified   
         Relationship to Patient            Clinical Status        

 

 Yes            No Known Drug Allergies            [id number]            Drug 
Allergy            Unknown            N/A                         2019   
                               



                  



Medications

      



There is no data.                  



Problems

      





 Date Dx Coded            Attending            Type            Code            
Diagnosis            Diagnosed By        

 

 2016            CARMEN MENDEZ, DAPHNE MERA            Ot            B20   
         HUMAN IMMUNODEFICIENCY VIRUS [HIV] DISEA                     

 

 2016            CARMEN MENDEZ, DAPHNE MERA            Ot            
F17.210            NICOTINE DEPENDENCE, CIGARETTES, UNCOMPL                     

 

 2016            CARMEN MENDEZ, DAPHNE MERA            Ot            J20.9 
           ACUTE BRONCHITIS, UNSPECIFIED                     

 

 2017            SUKHJINDER LAI APRN            Ot            B20      
      HUMAN IMMUNODEFICIENCY VIRUS [HIV] DISEA                     

 

 2017            SUKHJINDER LAI APRN            Ot            Z13.220  
          ENCOUNTER FOR SCREENING FOR LIPOID DISOR                     

 

 2017            SUKHJINDER LAI APRN            Ot            Z13.29   
         ENCOUNTER FOR SCREENING FOR OTH SUSPECTE                     

 

 2017            SUKHJINDER LAI R APRN            Ot            B20      
      HUMAN IMMUNODEFICIENCY VIRUS [HIV] DISEA                     

 

 2017            SUKHJINDER LAI APRN            Ot            Z13.220  
          ENCOUNTER FOR SCREENING FOR LIPOID DISOR                     

 

 2017            SUKHJINDER LAI R APRN            Ot            Z13.29   
         ENCOUNTER FOR SCREENING FOR OTH SUSPECTE                     

 

 2017            JANNET LAIA R APRN            Ot            B20      
      HUMAN IMMUNODEFICIENCY VIRUS [HIV] DISEA                     

 

 2017            SUKHJINDER LAI APRN            Ot            Z13.220  
          ENCOUNTER FOR SCREENING FOR LIPOID DISOR                     

 

 2017            SUKHJINDER LAI R APRN            Ot            Z13.29   
         ENCOUNTER FOR SCREENING FOR OTH SUSPECTE                     

 

 2017            SUKHJINDER LAI APRN            Ot            B20      
      HUMAN IMMUNODEFICIENCY VIRUS [HIV] DISEA                     

 

 2017            JOELLE, SUKHJINDER R APRN            Ot            Z13.220  
          ENCOUNTER FOR SCREENING FOR LIPOID DISOR                     

 

 2017            RUDI MARIANO SUKHJINDER R APRN            Ot            Z13.29   
         ENCOUNTER FOR SCREENING FOR OTH SUSPECTE                     

 

 2017            RUDI MARIANO SUKHJINDER R APRN            Ot            B20      
      HUMAN IMMUNODEFICIENCY VIRUS [HIV] DISEA                     

 

 2017            RUDI MARIANO SUKHJINDER R APRN            Ot            Z13.220  
          ENCOUNTER FOR SCREENING FOR LIPOID DISOR                     

 

 2017            RUDI MARIANO SUKHJINDER R APRN            Ot            Z13.29   
         ENCOUNTER FOR SCREENING FOR OTH SUSPECTE                     

 

 2017            RUDI MARIANO SUKHJINDER R APRN            Ot            B20      
      HUMAN IMMUNODEFICIENCY VIRUS [HIV] DISEA                     

 

 2017            ELEONORA LAIYLA R APRN            Ot            Z13.220  
          ENCOUNTER FOR SCREENING FOR LIPOID DISOR                     

 

 2017            RUDI MARIANO SUKHJINDER R APRN            Ot            Z13.29   
         ENCOUNTER FOR SCREENING FOR OTH SUSPECTE                     

 

 2017            DMITRIY EDDY DO            Ot            M48.06     
       SPINAL STENOSIS, LUMBAR REGION                     

 

 2017            DMITRIY EDDY DO            Ot            M51.26     
       OTHER INTERVERTEBRAL DISC DISPLACEMENT,                      

 

 2017            DMITRIY EDDY DO            Ot            M48.06     
       SPINAL STENOSIS, LUMBAR REGION                     

 

 2017            DMITRIY EDDY DO            Ot            M51.26     
       OTHER INTERVERTEBRAL DISC DISPLACEMENT,                      

 

 2017            DMITRIY EDDY DO            Ot            M48.06     
       SPINAL STENOSIS, LUMBAR REGION                     

 

 2017            DMITRIY EDDY DO            Ot            M51.26     
       OTHER INTERVERTEBRAL DISC DISPLACEMENT,                      

 

 2017            CARMEN MENDEZ, DAPHNE MERA            Ot            M54.41
            LUMBAGO WITH SCIATICA, RIGHT SIDE                     

 

 2017            CARMEN MENDEZ, DAPHNE MERA            Ot            M54.6 
           PAIN IN THORACIC SPINE                     

 

 2017            RUDI MARIANO SUKHJINDER R APRN            Ot            B20      
      HUMAN IMMUNODEFICIENCY VIRUS [HIV] DISEA                     

 

 2017            ELEONORA LAIYLA R APRN            Ot            Z13.220  
          ENCOUNTER FOR SCREENING FOR LIPOID DISOR                     

 

 2017            RUDI MARIANO SUKHJINDER R APRN            Ot            Z13.29   
         ENCOUNTER FOR SCREENING FOR OTH SUSPECTE                     

 

 10/03/2017            DMITRIY EDDY DO            Ot            M48.06     
       SPINAL STENOSIS, LUMBAR REGION                     

 

 10/03/2017            DMITRIY EDDY DO            Ot            M51.26     
       OTHER INTERVERTEBRAL DISC DISPLACEMENT,                      

 

 10/09/2017            SURJIT DODMITRIY            Ot            M48.06     
       SPINAL STENOSIS, LUMBAR REGION                     

 

 10/09/2017            SURJIT DODMITRIY M            Ot            M51.26     
       OTHER INTERVERTEBRAL DISC DISPLACEMENT,                      

 

 10/13/2017            SURJIT DODMITRIY            Ot            M48.02     
       SPINAL STENOSIS, CERVICAL REGION                     

 

 10/13/2017            SURJIT DODMITRIY            Ot            M54.12     
       RADICULOPATHY, CERVICAL REGION                     

 

 2017            SURJIT DODMITRIY            Ot            M48.06     
       SPINAL STENOSIS, LUMBAR REGION                     

 

 2017            SURJIT DODMITRIY M            Ot            M51.26     
       OTHER INTERVERTEBRAL DISC DISPLACEMENT,                      

 

 2017            SURJIT DODMITRIY            Ot            M48.02     
       SPINAL STENOSIS, CERVICAL REGION                     

 

 2017            SURJIT DODMITRIY M            Ot            M54.12     
       RADICULOPATHY, CERVICAL REGION                     

 

 2017            SURJIT DODMITRIY M            Ot            M48.06     
       SPINAL STENOSIS, LUMBAR REGION                     

 

 2017            SURJIT DODMITRIY M            Ot            M51.26     
       OTHER INTERVERTEBRAL DISC DISPLACEMENT,                      

 

 2017            DMITRIY EDDY DO            Ot            M48.02     
       SPINAL STENOSIS, CERVICAL REGION                     

 

 2017            DMITRIY EDDY DO            Ot            M54.12     
       RADICULOPATHY, CERVICAL REGION                     

 

 2018            DMITRIY EDDY DO            Ot            M48.06     
       SPINAL STENOSIS, LUMBAR REGION                     

 

 2018            DMITRIY EDDY DO M            Ot            M51.26     
       OTHER INTERVERTEBRAL DISC DISPLACEMENT,                      

 

 2018            DMITRIY EDDY DO            Ot            M48.06     
       SPINAL STENOSIS, LUMBAR REGION                     

 

 2018            DMITRIY EDDY DO M            Ot            M51.26     
       OTHER INTERVERTEBRAL DISC DISPLACEMENT,                      

 

 2018            DMITRIY EDDY DO            Ot            M48.06     
       SPINAL STENOSIS, LUMBAR REGION                     

 

 2018            DMITRIY EDDY DO M            Ot            M51.26     
       OTHER INTERVERTEBRAL DISC DISPLACEMENT,                      

 

 2018            DMITRIY EDDY DO            Ot            M48.06     
       SPINAL STENOSIS, LUMBAR REGION                     

 

 2018            DMITRIY EDDY DO M            Ot            M51.26     
       OTHER INTERVERTEBRAL DISC DISPLACEMENT,                      

 

 03/15/2018            DMITRIY EDDY DO            Ot            M48.06     
       SPINAL STENOSIS, LUMBAR REGION                     

 

 03/15/2018            DMITRIY EDDY DO M            Ot            M51.26     
       OTHER INTERVERTEBRAL DISC DISPLACEMENT,                      

 

 2018            SUKHJINDER LAI APRN            Ot            B20      
      HUMAN IMMUNODEFICIENCY VIRUS [HIV] DISEA                     

 

 2018            SUKHJINDER LAI R APRN            Ot            Z13.220  
          ENCOUNTER FOR SCREENING FOR LIPOID DISOR                     

 

 2018            RUDI MARIANO SUKHJINDER R APRN            Ot            Z13.29   
         ENCOUNTER FOR SCREENING FOR OTH SUSPECTE                     

 

 2018            SURJIT VARGAS DMITRIY HIPOLITO            Ot            M48.06     
       SPINAL STENOSIS, LUMBAR REGION                     

 

 2018            SURJIT VARGAS DMITRIY HIPOLITO            Ot            M51.26     
       OTHER INTERVERTEBRAL DISC DISPLACEMENT,                      

 

 2018            SURJIT VARGASDMITRIY            Ot            M48.02     
       SPINAL STENOSIS, CERVICAL REGION                     

 

 2018            SURJIT VARGASDMITRIY            Ot            M54.12     
       RADICULOPATHY, CERVICAL REGION                     

 

 2018            RUDI MARIANO SUKHJINDER R APRN            Ot            B20      
      HUMAN IMMUNODEFICIENCY VIRUS [HIV] DISEA                     

 

 2018            SUKHJINDER LAI APRN            Ot            Z13.220  
          ENCOUNTER FOR SCREENING FOR LIPOID DISOR                     

 

 2018            RUDI MARIANO SUKHJINDER R APRN            Ot            Z13.29   
         ENCOUNTER FOR SCREENING FOR OTH SUSPECTE                     

 

 2018            SURJIT VARGAS DMITRIY HIPOLITO            Ot            M48.06     
       SPINAL STENOSIS, LUMBAR REGION                     

 

 2018            SURJIT DODMITRIY            Ot            M51.26     
       OTHER INTERVERTEBRAL DISC DISPLACEMENT,                      

 

 2018            SURJIT VARGAS DMITRIY HIPOLITO            Ot            M48.02     
       SPINAL STENOSIS, CERVICAL REGION                     

 

 2018            SURJIT DODMITRIY            Ot            M54.12     
       RADICULOPATHY, CERVICAL REGION                     

 

 2018            SURJIT VARGAS DMITRIY HIPOLITO            Ot            M48.02     
       SPINAL STENOSIS, CERVICAL REGION                     

 

 2018            SURJIT VARGASDMITRIY            Ot            M54.12     
       RADICULOPATHY, CERVICAL REGION                     

 

 2018            SURJIT VARGASDMITRIY            Ot            M48.06     
       SPINAL STENOSIS, LUMBAR REGION                     

 

 2018            SURJIT VARGASDMITRIY            Ot            M51.26     
       OTHER INTERVERTEBRAL DISC DISPLACEMENT,                      



                                                                               
                                                                               
      



Procedures

      



There is no data.                  



Results

      





 Test            Result            Range        









 Complete blood count (CBC) with automated white blood cell (WBC) differential 
- 17 15:45         









 Blood leukocytes automated count (number/volume)            7.4 10*3/uL       
     4.3-11.0        

 

 Blood erythrocytes automated count (number/volume)            4.92 10*6/uL    
        4.35-5.85        

 

 Venous blood hemoglobin measurement (mass/volume)            15.5 g/dL        
    13.3-17.7        

 

 Blood hematocrit (volume fraction)            43 %            40-54        

 

 Automated erythrocyte mean corpuscular volume            87 [foz_us]          
  80-99        

 

 Automated erythrocyte mean corpuscular hemoglobin (mass per erythrocyte)      
      32 pg            25-34        

 

 Automated erythrocyte mean corpuscular hemoglobin concentration measurement (
mass/volume)            36 g/dL            32-36        

 

 Automated erythrocyte distribution width ratio            12.8 %            
10.0-14.5        

 

 Automated blood platelet count (count/volume)            184 10*3/uL          
  130-400        

 

 Automated blood platelet mean volume measurement            10.1 [foz_us]     
       7.4-10.4        

 

 Automated blood neutrophils/100 leukocytes            57 %            42-75   
     

 

 Automated blood lymphocytes/100 leukocytes            27 %            12-44   
     

 

 Blood monocytes/100 leukocytes            14 %            0-12        

 

 Automated blood eosinophils/100 leukocytes            2 %            0-10     
   

 

 Automated blood basophils/100 leukocytes            0 %            0-10        

 

 Blood neutrophils automated count (number/volume)            4.2 10*3         
   1.8-7.8        

 

 Blood lymphocytes automated count (number/volume)            2.0 10*3         
   1.0-4.0        

 

 Blood monocytes automated count (number/volume)            1.0 10*3            
0.0-1.0        

 

 Automated eosinophil count            0.1 10*3/uL            0.0-0.3        

 

 Automated blood basophil count (count/volume)            0.0 10*3/uL          
  0.0-0.1        









 Comprehensive metabolic panel - 17 15:45         









 Serum or plasma sodium measurement (moles/volume)            140 mmol/L       
     135-145        

 

 Serum or plasma potassium measurement (moles/volume)            3.8 mmol/L    
        3.6-5.0        

 

 Serum or plasma chloride measurement (moles/volume)            110 mmol/L     
               

 

 Carbon dioxide            21 mmol/L            21-32        

 

 Serum or plasma anion gap determination (moles/volume)            9 mmol/L    
        5-14        

 

 Serum or plasma urea nitrogen measurement (mass/volume)            14 mg/dL   
         7-18        

 

 Serum or plasma creatinine measurement (mass/volume)            1.04 mg/dL    
        0.60-1.30        

 

 Serum or plasma urea nitrogen/creatinine mass ratio            13             
NRG        

 

 Serum or plasma creatinine measurement with calculation of estimated 
glomerular filtration rate            >             NRG        

 

 Serum or plasma glucose measurement (mass/volume)            106 mg/dL        
            

 

 Serum or plasma calcium measurement (mass/volume)            8.7 mg/dL        
    8.5-10.1        

 

 Serum or plasma total bilirubin measurement (mass/volume)            2.6 mg/dL
            0.1-1.0        

 

 Serum or plasma alkaline phosphatase measurement (enzymatic activity/volume)  
          104 U/L                    

 

 Serum or plasma aspartate aminotransferase measurement (enzymatic activity/
volume)            15 U/L            5-34        

 

 Serum or plasma alanine aminotransferase measurement (enzymatic activity/volume
)            21 U/L            0-55        

 

 Serum or plasma protein measurement (mass/volume)            6.6 g/dL         
   6.4-8.2        

 

 Serum or plasma albumin measurement (mass/volume)            4.1 g/dL         
   3.2-4.5        









 Lipid 1996 panel - 17 15:45         









 Serum or plasma triglyceride measurement (mass/volume)            223 mg/dL   
         <150        

 

 Serum or plasma cholesterol measurement (mass/volume)            148 mg/dL    
        < 200        

 

 Serum or plasma cholesterol in HDL measurement (mass/volume)            33 mg/
dL            40-60        

 

 Cholesterol in LDL [mass/volume] in serum or plasma by direct assay            
88 mg/dL            1-129        

 

 Serum or plasma cholesterol in VLDL measurement (mass/volume)            45 mg/
dL            5-40        









 THYROID STIMULATING HORMONE - 17 15:45         









 THYROID STIMULATING HORMONE            1.02 u[iU]/mL            0.35-4.94     
   









 Serum or plasma thyroxine (T4) free measurement (mass/volume) - 17 15:45
         









 Serum or plasma thyroxine (T4) free measurement (mass/volume)            0.93 
ng/dL            0.70-1.48        









 [id number] - 17 15:45         









 HIV 1 RNA viral load measurement (log number/volume)            Not Detected  
           <=1.59        

 

 HIV-1 RNA detection by real time polymerase chain reaction            Not 
Detected             <=39        









 Blood CD3+CD4+ (T4 helper) cells/CD3+CD8+ (T8 suppressor cells) cells ratio - 
17 15:45         









 Percent of cells positive for CD4 antigen            41.2 %            NRG    
    

 

 Absolute CD4 count            868.2 /uL            500.0-2000.0        

 

 Percent of cells positive for CD8 antigen            37.0 %            15.0-
39.0        

 

 Absolute CD8 count            781 /uL            200-1300        

 

 Ratio of cells positive for CD3 and CD4 antigens to cells positive for CD3 and 
CD8 antigens            1.1 %            1.2-6.0        

 

 Lymphocyte percentage by flow cytometry            28.9 %            18.0-44.0
        

 

 WBC FOR FLOW            7.3 %            4.3-11.0        









 Testosterone,Free and Total - 17 10:36         









 Testosterone, Serum            399 ng/dL            264-916        

 

 Free Testosterone(Direct)            8.6 pg/mL            6.8-21.5        









 Metabolic Panel (14), Comprehensive (CMP) [id number] - 17 09:15         









 Glucose, Serum            122 mg/dL            65-99        

 

 BUN            12 mg/dL            6-24        

 

 Creatinine, Serum            1.35 mg/dL            0.76-1.27        

 

 eGFR If NonAfricn Am            61 mL/min/1.73                >59        

 

 eGFR If Africn Am            71 mL/min/1.73                >59        

 

 BUN/Creatinine Ratio            9             9-20        

 

 Sodium, Serum            142 mmol/L            134-144        

 

 Potassium, Serum            3.6 mmol/L            3.5-5.2        

 

 Chloride, Serum            103 mmol/L                    

 

 Carbon Dioxide, Total            23 mmol/L            18-29        

 

 Calcium, Serum            8.9 mg/dL            8.7-10.2        

 

 Protein, Total, Serum            6.3 g/dL            6.0-8.5        

 

 Albumin, Serum            4.1 g/dL            3.5-5.5        

 

 Globulin, Total            2.2 g/dL            1.5-4.5        

 

 A/G Ratio            1.9             1.2-2.2        

 

 Bilirubin, Total            1.6 mg/dL            0.0-1.2        

 

 Alkaline Phosphatase, S            81 IU/L                    

 

 AST (SGOT)            13 IU/L            0-40        

 

 ALT (SGPT)            24 IU/L            0-44        

 

 Ambiguous Test Order                         NRG        









 CD4/CD8 Ratio Profile [id number] - 17 09:15         









 WBC            7.0 x10E3/uL            3.4-10.8        

 

 RBC            4.63 x10E6/uL            4.14-5.80        

 

 Hemoglobin            14.7 g/dL            12.6-17.7        

 

 Hematocrit            44.6 %            37.5-51.0        

 

 MCV            96 fL            79-97        

 

 MCH            31.7 pg            26.6-33.0        

 

 MCHC            33.0 g/dL            31.5-35.7        

 

 RDW            13.3 %            12.3-15.4        

 

 Platelets            173 x10E3/uL            150-379        

 

 Neutrophils            56 %            Not Estab.        

 

 Lymphs            35 %            Not Estab.        

 

 Monocytes            6 %            Not Estab.        

 

 Eos            3 %            Not Estab.        

 

 Basos            0 %            Not Estab.        

 

 Immature Cells            NP             NRG        

 

 Neutrophils (Absolute)            3.8 x10E3/uL            1.4-7.0        

 

 Lymphs (Absolute)            2.5 x10E3/uL            0.7-3.1        

 

 Monocytes(Absolute)            0.4 x10E3/uL            0.1-0.9        

 

 Eos (Absolute)            0.2 x10E3/uL            0.0-0.4        

 

 Baso (Absolute)            0.0 x10E3/uL            0.0-0.2        

 

 Immature Granulocytes            0 %            Not Estab.        

 

 Immature Grans (Abs)            0.0 x10E3/uL            0.0-0.1        

 

 NRBC            NP             NRG        

 

 Hematology Comments:            NP             NRG        

 

 Absolute CD 4 Inola            1235 /uL            359-1519        

 

 % CD 4 Pos. Lymph.            49.4 %            30.8-58.5        

 

 Abs. CD 8 Suppressor            863 /uL            109-897        

 

 % CD 8 Pos. Lymph.            34.5 %            12.0-35.5        

 

 CD4/CD8 Ratio            1.43             0.92-3.72        









 Written Authorization - 17 09:15         









 Written Authorization                         NRG        









 CMP - 18 09:43         









 GLUCOSE            112 mg/dL            65-99        

 

 UREA NITROGEN (BUN)            13 mg/dL            7-25        

 

 CREATININE            1.02 mg/dL            0.70-1.33        

 

 eGFR NON-AFR. AMERICAN            85 mL/min/1.73m2            > OR=60        

 

 eGFR             99 mL/min/1.73m2            > OR=60        

 

 BUN/CREATININE RATIO            NOT APPLICABLE (calc)            6-22        

 

 SODIUM            138 mmol/L            135-146        

 

 POTASSIUM            3.8 mmol/L            3.5-5.3        

 

 CHLORIDE            105 mmol/L                    

 

 CARBON DIOXIDE            26 mmol/L            20-32        

 

 CALCIUM            9.7 mg/dL            8.6-10.3        

 

 PROTEIN, TOTAL            6.8 g/dL            6.1-8.1        

 

 ALBUMIN            4.4 g/dL            3.6-5.1        

 

 GLOBULIN            2.4 g/dL (calc)            1.9-3.7        

 

 ALBUMIN/GLOBULIN RATIO            1.8 (calc)            1.0-2.5        

 

 BILIRUBIN, TOTAL            1.6 mg/dL            0.2-1.2        

 

 ALKALINE PHOSPHATASE            109 U/L                    

 

 AST            16 U/L            10-35        

 

 ALT            29 U/L            9-46        









 CD4/CD8 Ratio Profile [id number] - 18 10:12         









 WBC            11.3 x10E3/uL            3.4-10.8        

 

 RBC            4.94 x10E6/uL            4.14-5.80        

 

 Hemoglobin            15.6 g/dL            13.0-17.7        

 

 Hematocrit            45.5 %            37.5-51.0        

 

 MCV            92 fL            79-97        

 

 MCH            31.6 pg            26.6-33.0        

 

 MCHC            34.3 g/dL            31.5-35.7        

 

 RDW            13.0 %            12.3-15.4        

 

 Platelets            229 x10E3/uL            150-379        

 

 Neutrophils            67 %            Not Estab.        

 

 Lymphs            21 %            Not Estab.        

 

 Monocytes            11 %            Not Estab.        

 

 Eos            1 %            Not Estab.        

 

 Basos            0 %            Not Estab.        

 

 Immature Cells            NP             NRG        

 

 Neutrophils (Absolute)            7.5 x10E3/uL            1.4-7.0        

 

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

## 2019-02-20 NOTE — XMS REPORT
[facility]

 Created on: 2016



Farhat Bond

External Reference #: [id number]

: 1970

Sex: Male



Demographics







 Address  [address] 

[place]  [address]

 

 Home Phone  [phone]

 

 Preferred Language  Unknown

 

 Marital Status  Unknown

 

 Christian Affiliation  Unknown

 

 Race  White

 

 Ethnic Group  Not  or 





Author







 Author  Johanna See

 

 [facility]  eClinical[employer]

 

 Address  Unknown

 

 Phone  Unavailable







Care Team Providers







 Care Team Member Name  Role  Phone

 

 Johanna See    Unavailable



                                                                



Allergies

          No Known Allergies                                                   
                                     



Problems

          





 Problem Type  Condition  Code  Onset Dates  Condition Status

 

 Problem  Acquired immunodeficiency syndrome  B20     Active

 

 Problem  Smoking  F17.200     Active

 

 Problem  Depression  F32.9     Active

 

 Problem  Benign essential hypertension  I10     Active

 

 Problem  Mixed hyperlipidemia  E78.2     Active



                                                                               
                                                 



Medications

          No Known Medications                                                 
                             



Results

          No Known Results                                                     
               



Summary Purpose

          eClinicalWorks Submission
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
## 2019-02-20 NOTE — XMS REPORT
[facility]

 Created on: 2017



AimeesFarhat

External Reference #: [id number]

: 1970

Sex: Male



Demographics







 Address  [address] [place] [address]  [address]

 

 Preferred Language  Unknown

 

 Marital Status  Unknown

 

 Jewish Affiliation  Unknown

 

 Race  Unknown

 

 Ethnic Group  Unknown





Author







 Author  Johanna See

 

 [facility]

 

 Address  [address], [place]  [address]



 

 Phone  [phone]







Care Team Providers







 Care Team Member Name  Role  Phone

 

 Johanna See  Unavailable  [phone]







PROBLEMS







 Type  Condition  ICD9-CM Code  OTR98-ND Code  Onset Dates  Condition Status  
SNOMED Code

 

 Problem  Acquired immunodeficiency syndrome     B20     Active  30385083

 

 Problem  Cigarette smoker     F17.210     Active  88922076

 

 Problem  Depression     F32.9     Active  52117495

 

 Problem  Mixed hyperlipidemia     E78.2     Active  128614733

 

 Problem  Benign essential hypertension     I10     Active  7444378

 

 Problem  Generalized osteoarthritis of multiple sites     M15.9     Active  
560211794

 

 Problem  Other chronic pain     G89.29     Active  61778742

 

 Problem  Crushing injury of right foot, sequela     S97.81XS     Active  
16660898

 

 Problem  Arthralgia of right ankle     M25.571     Active  794849318

 

 Problem  Bronchiolitis     J21.9     Active  5767098

 

 Problem  Erectile dysfunction, unspecified erectile dysfunction type     N52.9
     Active  535193147







ALLERGIES

Unknown Allergies



SOCIAL HISTORY

No smoking Hx information available



PLAN OF CARE







 Activity  Details









  









 Follow Up  3 Months Reason:null

 

 Pending Test  Human Immunodeficiency Virus (HIV-1), Quantitative, Real-time 
PCR (graph) 74789 

 

 Pending Test  Lipid Panel 88211 

 

 Pending Test  Metabolic Panel (14), Comprehensive (CMP) 44587 

 

 Pending Test  CD4/CD8 Ratio Profile 92800 

 

 Pending Test  QMP Plus D/L (urine) 

 

 Pending Test  Opioid/Opiate Agreement (Annual) 



      



VITAL SIGNS







 Height  72 in  2017

 

 Weight  222 lbs  2017

 

 Temperature  97.0 degrees Fahrenheit  2017

 

 Heart Rate  87 /min  2017

 

 Respiratory Rate  18 /min  2017

 

 Oximetry  97 %  2017

 

 BMI  30.11 kg/m2  2017

 

 Blood pressure systolic  120 mm Hg  2017

 

 Blood pressure diastolic  82 mm Hg  2017







MEDICATIONS







 Medication  Instructions  Dosage  Frequency  Start Date  End Date  Duration  
Status

 

 Cialis 20 MG  Orally every 72 hrs  1 tablet as needed     17 May, 2013     30 
days  Active

 

 Pravastatin Sodium 40 MG  Orally Once a day  1 tablet  24h  04 2015     
30 day(s)  Active

 

 Descovy 200-25 mg  Orally Once a day  1 Tablet  24h       30 days  
Active

 

 Ventolin  (90 Base) MCG/ACT  Inhalation every 4 hrs  2 puffs as needed  
4h  10 2017     30 days  Active

 

 Evotaz 300/150 mg  Oral Daily  1  24h  15 May, 2015     30 days  Active

 

 Lisinopril 40     TAKE ONE TABLET BY MOUTH DAILY           30  Active

 

 Voltaren 1 %  Transdermal four times a day  2-4 gm apply to right foot/ankle  
6h  21 Oct, 2016     30 days  Active

 

 Norco  MG  Orally every 12 hrs  1 tablet as needed  12h  11 Aug, 2017   
  30 days  Active

 

 Ibuprofen 800     TAKE ONE TABLET BY MOUTH THREE TIMES A DAY AS NEEDED        
   30  Active







RESULTS

No Results



PROCEDURES







 Procedure  Date Ordered  Related Diagnosis  Body Site

 

 COMPREHEN METABOLIC PANEL  Aug 11, 2017      

 

 T CELL, ABSOLUTE COUNT/RATIO  Aug 11, 2017      

 

 Office Visit, Est Pt., Level 4  Aug 11, 2017      

 

 IMMUNIZATION ADMIN  Aug 11, 2017      

 

 Billed by outside source  Aug 11, 2017      

 

 HIV-1, DNA, QUANT  Aug 11, 2017      

 

 Pneumococcal polysaccharide PPV23  Aug 11, 2017      

 

 LIPID PANEL SO  Aug 11, 2017      







IMMUNIZATIONS







 Vaccine  Route  Administration Date  Status

 

 Pneumococcal polysaccharide PPV23  IM  Intramuscular  Aug 11, 2017  
Administered

## 2019-02-20 NOTE — XMS REPORT
[facility]

 Created on: 2016



Ronda Farhat

External Reference #: [id number]

: 1970

Sex: Male



Demographics







 Address  [address] [place] [address]  [address]

 

 Home Phone  [phone]

 

 Preferred Language  Unknown

 

 Marital Status  Unknown

 

 Buddhist Affiliation  Unknown

 

 Race  White

 

 Ethnic Group  Not  or 





Author







 Lana Valderrama

 

 [facility]  [employer]

 

 Address  Unknown

 

 Phone  Unavailable







Care Team Providers







 Care Team Member Name  Role  Phone

 

 Ladarius  Lana  CP  Unavailable



                                                                



Allergies, Adverse Reactions, Alerts

          





 Substance  Reaction  Event Type

 

 N.K.D.A.  Info Not Available  Non Drug Allergy



                                                                               
         



Problems

          





 Problem Type  Condition  Code  Onset Dates  Condition Status

 

 Problem  Acquired immunodeficiency syndrome  B20     Active

 

 Problem  Smoking  F17.200     Active

 

 Problem  Depression  F32.9     Active

 

 Assessment  Acquired immunodeficiency syndrome  B20     Active

 

 Problem  Benign essential hypertension  I10     Active

 

 Problem  Mixed hyperlipidemia  E78.2     Active



                                                                               
                                                           



Medications

          





 Medication  Code System  Code  Instructions  Start Date  End Date  Status  
Dosage

 

 Ibuprofen  NDC  72316507750  800             TAKE ONE TABLET BY MOUTH THREE 
TIMES DAILY AS NEEDED

 

 Cialis  NDC  [address]-76  20 MG Orally every 72 hrs  May 17, 2013        1 
tablet as needed

 

 Truvada  NDC  20228578426  200-300 MG             TAKE ONE TABLET BY MOUTH 
ONCE DAILY

 

 Zoloft  NDC  [address]-22  100 MG Orally Once a day  2015        1 
tablet

 

 Evotaz  NDC  6344-4951-75  300/150 mg Oral Daily  May 15, 2015        1

 

 Wellbutrin SR  NDC  [address]-74  150 MG Orally 2 tabs qam and 1 tab qpm  
2015        1 tablet

 

 Lisinopril  NDC  96607950979  20 Orally Once a day           2 tablet s

 

 Pravastatin Sodium  NDC  [address]-10  40 MG Orally Once a day  2015 
       1 tablet

 

 Evotaz  NDC  27714198551  300-150             TAKE ONE TABLET BY MOUTH DAILY

 

 Descovy  NDC  [address]-68  200-25 mg Orally Once a day  2016        
1 Tablet



                                                                               
                                                                               
                    



Procedures

          





 Procedure  Coding System  Code  Date

 

 T CELL, ABSOLUTE COUNT/RATIO  CPT-4  29479  2016

 

 COMPREHEN METABOLIC PANEL  CPT-4  01570  2016

 

 HIV-1, DNA, QUANT  CPT-4  83688  2016

 

 Office Visit, Est Pt., Level 3  CPT-4  44691  2016



                                                                               
                                                 



Vital Signs

          





 Date/Time:  2016

 

 Temperature  97.4 F

 

 Weight  208.2 lbs

 

 Height  72 in

 

 Respiratory Rate  20 /min

 

 Cardiac Monitoring Heart Rate  92 /min

 

 Blood Pressure Diastolic  82 mm Hg

 

 Blood Pressure Systolic  138 mm Hg

 

 BMI  28.23 Index



                                                                    



Results

          





 Name  Result  Date  Reference Range  Unit  Abnormality Flag

 

 CD4/CD8 Ratio Profile 25110               

 

 ----Monocytes(Absolute)  1.0  96111947  0.1-0.9   x10E3/uL  H

 

 ----Eos (Absolute)  0.1  30927826  0.0-0.4   x10E3/uL   

 

 ----Baso (Absolute)  0.0  20041000  0.0-0.2   x10E3/uL   

 

 ----Immature Granulocytes  0  42269047     %   

 

 ----Immature Grans (Abs)  0.0  83499841  0.0-0.1   x10E3/uL   

 

 ----NRBC  NP  2016         

 

 ----Hematology Comments:  NP  2016         

 

 ----Immature Cells  NP  2016         

 

 ----Neutrophils (Absolute)  4.5  75525305  1.4-7.0   x10E3/uL   

 

 ----Lymphs (Absolute)  2.1  63680261  0.7-3.1   x10E3/uL   

 

 ----MCH  31.1  57469389  26.6-33.0   pg   

 

 ----MCV  89  66074536  79-97   fL   

 

 ----Abs. CD 8 Suppressor  796  62901856  109-897   /uL   

 

 ----Basos  0  97788969     %   

 

 ----% CD 8 Pos. Lymph.  37.9  51338613  12.0-35.5   %  H

 

 ----Eos  2  55280073     %   

 

 ----CD4/CD8 Ratio  1.11  49870595  0.92-3.72       

 

 ----Monocytes  13  90210342     %   

 

 ----WBC  7.8  48143093  3.4-10.8   x10E3/uL   

 

 ----Lymphs  27  89063864     %   

 

 ----RBC  4.85  53856399  4.14-5.80   x10E6/uL   

 

 ----Neutrophils  58  56228515     %   

 

 ----Hemoglobin  15.1  87672233  12.6-17.7   g/dL   

 

 ----Platelets  189  23106731  150-379   x10E3/uL   

 

 ----RDW  14.0  38907197  12.3-15.4   %   

 

 ----Hematocrit  43.1  65835574  37.5-51.0   %   

 

 ----Absolute CD 4 Baird  886  94193079  359-1519   /uL   

 

 ----MCHC  35.0  75370314  31.5-35.7   g/dL   

 

 ----% CD 4 Pos. Lymph.  42.2  05300207  30.8-58.5   %   

 

 Human Immunodeficiency Virus (HIV-1), Quantitative, Real-time PCR (graph) 
04876               

 

 ----HIV-1 RNA by PCR  <20  44388995     copies/mL   

 

 ----log10 HIV-1 RNA  TNP  50544840     pqs76whgb/mL   

 

 Metabolic Panel (14), Comprehensive (CMP) 43645               

 

 ----Sodium, Serum  141  22601275  134-144   mmol/L   

 

 ----BUN/Creatinine Ratio  8  76451759  9-20      L

 

 ----Chloride, Serum  100  11906628     mmol/L   

 

 ----Potassium, Serum  4.1  05289432  3.5-5.2   mmol/L   

 

 ----Calcium, Serum  9.1  80947827  8.7-10.2   mg/dL   

 

 ----Protein, Total, Serum  6.6  88552186  6.0-8.5   g/dL   

 

 ----Carbon Dioxide, Total  24  03353462  18-29   mmol/L   

 

 ----A/G Ratio  2.0  49903733  1.1-2.5       

 

 ----eGFR If NonAfricn Am  58  51442133      >59   mL/min/1.73  L

 

 ----Bilirubin, Total  2.7  34244021  0.0-1.2   mg/dL  H

 

 ----eGFR If Africn Am  67  51304817      >59   mL/min/1.73   

 

 ----BUN  12  96847686  6-24   mg/dL   

 

 ----Albumin, Serum  4.4  49876648  3.5-5.5   g/dL   

 

 ----Globulin, Total  2.2  96684126  1.5-4.5   g/dL   

 

 ----Creatinine, Serum  1.42  07068788  0.76-1.27   mg/dL  H

 

 ----ALT (SGPT)  22  2016  0-44   IU/L   

 

 ----Glucose, Serum  81  2016  65-99   mg/dL   

 

 ----Alkaline Phosphatase, S  127  2016     IU/L  H

 

 ----AST (SGOT)  11  2016  0-40   IU/L   



                                                                               
         



Summary Purpose

          eClinicalWorks Submission
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
## 2019-02-20 NOTE — PROGRESS NOTE-PRE OPERATIVE
Pre-Operative Progress Note


H&P Reviewed


The H&P was reviewed, patient examined and no changes noted.


Time Seen by Provider:  14:14


Date H&P Reviewed:  Feb 20, 2019


Time H&P Reviewed:  14:15


Pre-Operative Diagnosis:  Melena and Gastritis











HIRAM ESPINO DO Feb 20, 2019 14:18

## 2019-02-20 NOTE — XMS REPORT
[facility]

 Created on: 2016



Farhat Bond

External Reference #: [id number]

: 1970

Sex: Male



Demographics







 Address  [address]

[place]  [address]

 

 Home Phone  [phone]

 

 Preferred Language  Unknown

 

 Marital Status  Unknown

 

 Pentecostalism Affiliation  Unknown

 

 Race  White

 

 Ethnic Group  Not  or 





Author







 Author  YOMI MURRIETA

 

 Organization  [employer]

 

 Address  Unknown

 

 Phone  Unavailable







Care Team Providers







 Care Team Member Name  Role  Phone

 

 YOMI MURRIETA  CP  Unavailable



                                                                



Allergies

          No Known Allergies                                                   
                                     



Problems

          





 Problem Type  Condition  Code  Onset Dates  Condition Status

 

 Problem  HIV (human immunodeficiency virus infection)  Z21     Active

 

 Problem  Essential hypertension  I10     Active

 

 Problem  Tobacco use  Z72.0     Active

 

 Problem  Hyperlipidemia, unspecified hyperlipidemia type  E78.5     Active

 

 Problem  Moderate single current episode of major depressive disorder  F32.1  
   Active



                                                                               
                                                 



Medications

          No Known Medications                                                 
                             



Results

          No Known Results                                                     
               



Summary Purpose

          eClinicalWorks Submission

## 2019-02-20 NOTE — XMS REPORT
[facility]

 Created on: 09/10/2015



Farhat Bond

External Reference #: [id number]

: 1970

Sex: Male



Demographics







 Address  [address] 

[place]  [address]

 

 Home Phone  [phone]

 

 Preferred Language  Unknown

 

 Marital Status  Unknown

 

 Worship Affiliation  Unknown

 

 Race  White

 

 Ethnic Group  Not  or 





Author







 Author  Johanna See

 

 Organization  [employer]

 

 Address  Unknown

 

 Phone  Unavailable







Care Team Providers







 Care Team Member Name  Role  Phone

 

 Johanna See    Unavailable



                                                                



Allergies

          No Known Allergies                                                   
                                     



Problems

          





 Problem Type  Condition  ICD-9 Code  Onset Dates  Condition Status

 

 Assessment  Hyperlipidemia  272.4     Active

 

 Problem  Nondependent tobacco use disorder  305.1     Active

 

 Problem  Human immunodeficiency virus (HIV) disease  042     Active

 

 Problem  Smoker  305.1     Active

 

 Problem  Depression  311     Active

 

 Problem  Hyperlipidemia  272.4     Active

 

 Problem  Essential hypertension, benign  401.1     Active

 

 Problem  Pain in soft tissues of limb  729.5     Active

 

 Problem  Hyperglycemia  790.29     Active

 

 Problem  ED (erectile dysfunction)  607.84     Active



                                                                               
                                                                               
                    



Medications

          





 Medication  Code System  Code  Instructions  Start Date  End Date  Status  
Dosage

 

 Pravastatin Sodium  NDC  [address]-10  40 MG Orally QHS  2015        
1 tablet



                                                                              



Results

          No Known Results                                                     
               



Summary Purpose

          eClinicalWorks Submission

## 2019-02-20 NOTE — XMS REPORT
[facility]

 Created on: 2015



Farhat Bond

External Reference #: [id number]

: 1970

Sex: Male



Demographics







 Address  [address] 

[place]  [address]

 

 Home Phone  [phone]

 

 Preferred Language  Unknown

 

 Marital Status  Unknown

 

 Scientology Affiliation  Unknown

 

 Race  White

 

 Ethnic Group  Not  or 





Author







 Author  Annia Chen

 

 Organization  [employer]

 

 Address  Unknown

 

 Phone  Unavailable







Care Team Providers







 Care Team Member Name  Role  Phone

 

 Annia Chen  CP  Unavailable



                                                                



Allergies

          No Known Allergies                                                   
                                     



Problems

          





 Problem Type  Condition  Code  Onset Dates  Condition Status

 

 Problem  Long term (current) use of opiate analgesic  V58.69     Inactive

 

 Problem  Impotence of organic origin  607.84     Inactive

 

 Problem  Need for prophylactic vaccination against streptococcus pneumoniae (
pneumococcus)  V03.82     Inactive

 

 Problem  Unspecified infectious and parasitic diseases  136.9     Inactive

 

 Problem  Essential hypertension, benign  401.1     Active

 

 Problem  Pain in soft tissues of limb  729.5     Inactive

 

 Problem  Human immunodeficiency virus [HIV]  042     Inactive

 

 Problem  Need for prophylactic vaccination and inoculation, Influenza  V04.81 
    Inactive

 

 Problem  Unspecified sinusitis (chronic)  473.9     Inactive

 

 Problem  Nondependent tobacco use disorder  305.1     Active

 

 Problem  Screening examination for venereal disease  V74.5     Inactive



                                                                               
                                                                               
                              



Medications

          





 Medication  Code System  Code  Instructions  Start Date  End Date  Status  
Dosage

 

 Zoloft  NDC  [address]-41  100 MG Orally Once a day  2015        1 
tablet



                                                                              



Results

          No Known Results                                                     
               



Summary Purpose

          eClinicalWorks Submission
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
## 2019-02-20 NOTE — XMS REPORT
Patient Summary (HL7 CCD)

 Created on: 2015



HOWARD PAZ

External Reference #: [id number]

: 1970

Sex: Male



Demographics







 Address  [address] 

[place]  [address]

 

 Home Phone  [phone]

 

 Preferred Language  English

 

 Marital Status  Unknown

 

 Tenriism Affiliation  Unknown

 

 Race  White

 

 Ethnic Group  Not  or 





Author







 Author  GINNA WEN

 

 Organization  Unknown

 

 Address  [address]

[place], [place]  [address]



 

 Phone  [phone]







Care Team Providers







 Care Team Member Name  Role  Phone

 

 WALTER MENDEZ, YOMI CABRERA  Attphys  [phone]

 

 YOMI WATSON MDsushanique  [phone]



                                            



Vital Signs

          Unknown or Not Available.                                            
                        



Allergies

          Unknown or Not Available.                                            
                                  



Procedures

          





 Procedure        Code        Procedure Type        Date    

 

 CX CHEST 2 VIEWS        492063929        SNOMED CT        2015    

 

 ^CBC W/ MANUAL DIFF        69341069        SNOMED CT        2015    

 

 CULTURE BLOOD        03786308        SNOMED CT        2015    

 

 COMPREHENSIVE METABOLIC PANEL        635885142        SNOMED CT        2015    

 

 CBC W/ AUTO DIFF (RFLX MAN DIFF IF IND)        7984309        SNOMED CT        
2015    

 

 INFLUENZA A & B        842704105        SNOMED CT        2015    



                                                                               
                                                 



History of Immunizations

          Unknown or Not Available.                                            
                                  



Problems

          Unknown or Not Available.                                            
                                            



Results

          







 COMPREHENSIVE METABOLIC PANEL - Collect Date/Time: 2015 22:30     

 

 Test Name        Code        Test Result        Test Units        Test Ref 
Range    

 

 GLUCOSE        2345-7        111        MG/DL        L=70       H=100    

 

 SODIUM        2951-2        139        MEQ/L        L=135      H=148    

 

 POTASSIUM        2823-3        3.8        MEQ/L        L=3.5      H=5.3    

 

 CHLORIDE        2075-0        106        MEQ/L        L=96       H=110    

 

 CO2        2028-9        22        MEQ/L        L=22       H=29    

 

 BUN        3094-0        7        MG/DL        L=8        H=22    

 

 CREATININE        2160-0        1.1        MG/DL        L=0.6      H=1.6    

 

 SGOT/AST        1920-8        20        IU/L        L=10       H=40    

 

 SGPT/ALT        1742-6        38        IU/L        L=8        H=54    

 

 ALK PHOS        6768-6        101        IU/L        L=35       H=115    

 

 TOTAL PROTEIN        2885-2        6.8        G/DL        L=5.5      H=8.5    

 

 ALBUMIN        1751-7        4.2        G/DL        L=3.1      H=5.4    

 

 TOTAL BILI        1975-2        1.6        MG/DL        L=0.0      H=1.5    

 

 CALCIUM        93018-9        9.2        MG/DL        L=8.2      H=10.6    

 

 AGE                 44        yrs             

 

 GFR NonAA                 72                      

 

 GFR AA                 87                      

 

 eGFR                 >60        N/A             

 

 eGFR AA*                 >60        N/A             











 CBC W/ AUTO DIFF (RFLX MAN DIFF IF IND) - Collect Date/Time: 2015 22:30 
    

 

 Test Name        Code        Test Result        Test Units        Test Ref 
Range    

 

 WBC        03449-5        7.5        TH/CMM        L=4.5      H=10.8    

 

 RBC        789-8        4.65        ML/CMM        L=4.70     H=6.10    

 

 HGB        718-7        14.7        G/DL        L=14.0     H=18.0    

 

 HCT        4544-3        42.3        %        L=42.0     H=52.0    

 

 MCV                 91        FL        L=81       H=99    

 

 MCH                 31.6        PG        L=27.0     H=33.0    

 

 MCHC                 34.8        G/DL        L=31.0     H=36.0    

 

 RDW SD                 41        FL        L=36       H=50    

 

 RDW CV                 12.4        %        L=0.0      H=14.8    

 

 MPV                 10.4        FL        L=9.3      H=12.5    

 

 PLT        777-3        147        TH/CMM        L=130      H=440    

 

 NRBC#                 0.00        TH/CMM        L=0.00     H=0.00    

 

 NRBC%                 0.0        /100WBC        L=0.0      H=2.0    

 

 %NEUT                 65.9        %             

 

 %LYMP                 11.9        %             

 

 %MONO                 20.1        %             

 

 %EOS                 2.0        %             

 

 %BASO                 0.1        %             

 

 #NEUT                 4.92        TH/CMM        L=2.10     H=8.20    

 

 #LYMP                 0.89        TH/CMM        L=0.90     H=5.20    

 

 #MONO                 1.50        TH/CMM        L=0.16     H=1.00    

 

 #EOS                 0.15        TH/CMM        L=0.00     H=0.80    

 

 #BASO                 0.01        TH/CMM        L=0.00     H=0.20    

 

 SEGS                 69        %             

 

 BANDS                 3        %             

 

 LYMPHS                 24        %             

 

 MONOS                 1        %             

 

 EOS                 3        %             

 

 MANUAL DIFF                 SEE BELOW        N/A             

 

 ATYP LYMPHS                 1+        N/A             











 INFLUENZA A & B - Collect Date/Time: 2015 21:30     

 

 Test Name        Code        Test Result        Test Units        Test Ref 
Range    

 

 INFLUENZA A & B        6437-8        NO INFLUENZA A OR B DETECTED        N/A  
           



                                                                               
                   



Active Medications

          Unknown or Not Available.                                            
                        



Medications Administered During Visit

          Unknown or Not Available.                                            
                                  



Encounters

          





 Encounter Diagnosis        Diagnosis Code        Start Date    

 

 FEVER UNSPCIFIED        40454        2015    



                                                                    



Social History

          





 Smoking Status        Code        Start Date        End Date    

 

 Current every day smoker        815198546                      



                                                                    



Patient Decision Aids

          Unknown or Not Available.                                            
              



Discharge Instructions

          





         



You were admitted to [facility] on 2015 with a principal 
diagnosis of FEVER UNSPCIFIED.        



You were discharged from [facility] on 2015.        



Should you have any questions prior to discharge, please contact a member of 
your healthcare team. If you have left the hospital and have any questions, 
please contact your primary care physician.          



                                                          



Chief Complaint and Reason For Visit

          





 Chief Complaint        Date of Onset    

 

 FEVER WEAKNESS COUGH             



                                                          



Function Status

          Unknown or Not Available.                                            
              



Referral/Transition of Care

          Unknown or Not Available.

## 2019-02-20 NOTE — XMS REPORT
[facility]

 Created on: 2015



Farhat Bond

External Reference #: [id number]

: 1970

Sex: Male



Demographics







 Address  [address] 

[place]  [address]

 

 Home Phone  [phone]

 

 Preferred Language  Unknown

 

 Marital Status  Unknown

 

 Buddhist Affiliation  Unknown

 

 Race  White

 

 Ethnic Group  Not  or 





Author







 Author  Johanna See

 

 [facility]  [employer]

 

 Address  Unknown

 

 Phone  Unavailable







Care Team Providers







 Care Team Member Name  Role  Phone

 

 Johanna See    Unavailable



                                                                



Allergies

          No Known Allergies                                                   
                                     



Problems

          





 Problem Type  Condition  Code  Onset Dates  Condition Status

 

 Problem  Human immunodeficiency virus (HIV) disease  042     Active

 

 Problem  Pain in soft tissues of limb  729.5     Active

 

 Problem  Nondependent tobacco use disorder  305.1     Active

 

 Assessment  Depression  F32.9     Active

 

 Problem  Hyperlipidemia  272.4     Active

 

 Problem  Smoker  305.1     Active

 

 Problem  Depression  F32.9     Active

 

 Problem  ED (erectile dysfunction)  607.84     Active

 

 Problem  Essential hypertension, benign  401.1     Active

 

 Problem  Depression  311     Active

 

 Problem  Hyperglycemia  790.29     Active



                                                                               
                                                                               
                              



Medications

          





 Medication  Code System  Code  Instructions  Start Date  End Date  Status  
Dosage

 

 Wellbutrin SR  NDC  [address]-67  150 MG Orally Twice a day  2015   
     1 tablet



                                                                              



Results

          No Known Results                                                     
               



Summary Purpose

          [employer] Submission
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
## 2019-02-20 NOTE — XMS REPORT
[facility]

 Created on: 2018



Farhat Bond

External Reference #: [id number]

: 1970

Sex: Male



Demographics







 Address  [address] [place] [address]  [address]

 

 Preferred Language  Unknown

 

 Marital Status  Unknown

 

 [employer] Affiliation  Unknown

 

 Race  Unknown

 

 Ethnic Group  Unknown





Author







 Author  Annia Chen

 

 Organization  [facility]

 

 Address  [address] [place] [address]  [id number]



 

 Phone  [phone]







Care Team Providers







 Care Team Member Name  Role  Phone

 

 Annia hCen  Unavailable  [phone]







PROBLEMS







 Type  Condition  ICD9-CM Code  DSP65-OP Code  Onset Dates  Condition Status  
SNOMED Code

 

 Problem  Benign essential hypertension     I10     Active  7367180

 

 Problem  Arthralgia of right ankle     M25.571     Active  580524462

 

 Problem  Depression     F32.9     Active  46940813

 

 Problem  Acquired immunodeficiency syndrome     B20     Active  75374396

 

 Problem  Mixed hyperlipidemia     E78.2     Active  106078281

 

 Problem  Generalized osteoarthritis of multiple sites     M15.9     Active  
322222365

 

 Problem  Bronchiolitis     J21.9     Active  5237691

 

 Problem  Cigarette smoker     F17.210     Active  65874723

 

 Problem  Erectile dysfunction, unspecified erectile dysfunction type     N52.9
     Active  879917439

 

 Problem  Other chronic pain     G89.29     Active  48409120

 

 Problem  Crushing injury of right foot, sequela     S97.81XS     Active  
54358673







ALLERGIES

No Information



ENCOUNTERS







 Encounter  Location  Date  Diagnosis

 

 [address] [address]     

 

 [address] [address]     

 

 [address] [address]     

 

 [facility]  [address] [place] [id number]    Acquired immunodeficiency syndrome B20 and Influenza vaccine needed 
Z23

 

 [facility]  [address] [place] [id number]  24 
Oct, 2017   

 

 [address] [address]  03 
Oct, 2017  Depression F32.9

 

 [address] [address]  24 
Aug, 2017   

 

 [facility]  [address] [place] [address] 
[id number]  11 Aug, 2017  Acquired immune deficiency syndrome B20 ; Long term 
current use of opiate analgesic Z79.891 ; Benign essential hypertension I10 ; 
Mixed hyperlipidemia E78.2 ; Cigarette smoker F17.210 and Generalized 
osteoarthritis of multiple sites M15.9

 

 [facility]  [address] [place] [address] 
[id number]  12 May, 2017  Acquired immune deficiency syndrome B20 ; Depression 
F32.9 ; Benign essential hypertension I10 ; Mixed hyperlipidemia E78.2 and 
Cigarette smoker F17.210

 

 [address] [address]     

 

 [address] 
[id number]  10 Feb, 2017  Acquired immune deficiency syndrome B20 ; Screening 
examination for sexually transmitted disease Z11.3 ; Pain in right ankle and 
joints of right foot M25.571 ; Other chronic pain G89.29 ; Bronchiolitis J21.9 
and Depression F32.9

 

 [facility]  [address] [place] [id number]  21 Oct, 2016  
Acquired immunodeficiency syndrome B20 ; Influenza vaccine needed Z23 ; 
Erectile dysfunction, unspecified erectile dysfunction type N52.9 ; Arthralgia 
of right ankle M25.571 and Crushing injury of right foot, sequela S97.81XS

 

 [address] [address]  08 
Sep, 2016   

 

 [address] [address]  08 
Sep, 2016   

 

 [address] 
[id number]    Acquired immunodeficiency syndrome B20

 

 [address] 
[id number]  18 Mar, 2016  Acquired immune deficiency syndrome B20 ; Encounter 
for immunization Z23 ; Screening examination for sexually transmitted disease 
Z11.3 and Depression F32.9

 

 [facility]  [address] [place] [id number]     

 

 [address] [address]    Essential (primary) hypertension I10

 

 [facility] [address] 
[id number]  18 Dec, 2015  Human immunodeficiency virus (HIV) disease B20 and 
Smoking F17.200

 

 [address] [address]  10 
Nov, 2015   

 

 [address] [address]    Depression F32.9

 

 [address] [address]  24 
Sep, 2015  Hyperlipidemia 272.4

 

 [address] [address]  10 
Sep, 2015  Hyperlipidemia 272.4

 

 [facility] [address] 
[id number]  04 Sep, 2015  Human immunodeficiency virus (HIV) disease 042 ; Needs 
flu shot V04.81 ; Hyperlipidemia 272.4 ; Smoker 305.1 and Depression 311

 

 [address] [address]  12 
Aug, 2015   

 

 [address] [address]     

 

 [facility] [address] 
[id number]  15 May, 2015  Human immunodeficiency virus (HIV) disease 042 ; 
Nondependent tobacco use disorder 305.1 ; Essential hypertension, benign 401.1 
and Hyperglycemia 790.29

 

 [address] [address]     

 

 [address] [address]     

 

 [address] [address]     

 

 [address] [address]     

 

 [facility] [address] 
[id number]    Asymptomatic human immunodeficiency virus (HIV) 
infection status V08 ; Smoker 305.1 and Depression 311

 

 [facility] [address] [place] 
[id number]  12 Sep, 2014  Essential hypertension, benign 401.1 ; Nondependent 
tobacco use disorder 305.1 and HIV (human immunodeficiency virus infection) V08

 

 [facility]  [address] [address] [facility] [address]  [place], [place] [id number]  29 Aug, 2014 
  

 

 [facility]  [address] [facility] [address] [place] [id number]   
  

 

 [facility]  [address] [facility] [address]     

 

 [facility]  [address] [facility] [address]     

 

 [facility]  [address] [place] [facility] [address]  04 
Oct, 2013   

 

 [facility]  [address] [facility] [address]     

 

 [facility]  [address] [place] [address]     

 

 [facility]  [address] [facility] [address]     

 

 [facility]  [address] [facility] [address]     

 

 [facility]  [address] [place] [facility] [address]  14 
Sep, 2012   

 

 [facility]  [address] [place] [address]  03 
Aug, 2012   

 

 [facility]  [address] [place] [facility] [address]  30 
Mar, 2012   

 

 [facility]  [address] [facility] [address]     

 

 [facility]  [address] [facility] [address]  [place], [place] [id number]  07 Oct, 2011 
  







IMMUNIZATIONS

No Known Immunizations



SOCIAL HISTORY

Never Assessed



REASON FOR VISIT





PLAN OF CARE





VITAL SIGNS





MEDICATIONS







 Medication  Instructions  Dosage  Frequency  Start Date  End Date  Duration  
Status

 

 Lisinopril 40     TAKE ONE TABLET BY MOUTH DAILY           30  Active







RESULTS

No Results



PROCEDURES

No Known procedures



INSTRUCTIONS





MEDICATIONS ADMINISTERED

No Known Medications



MEDICAL (GENERAL) HISTORY







 Type  Description  Date

 

 Medical History  Benign essential HTN , (Desc:Benign essential hypertension) ;
   

 

 Medical History  Acquired immune deficiency syndrome ;   

 

 Medical History  Smoking ;   

 

 Medical History  Human immunodeficiency virus (HIV) disease   

 

 Medical History  Nondependent tobacco use disorder   

 

 Medical History  Essential hypertension, benign   

 

 Medical History  Pain in soft tissues of limb   

 

 Medical History  Hyperglycemia   

 

 Medical History  ED (erectile dysfunction)   

 

 Medical History  Hyperlipidemia   

 

 Medical History  Smoker   

 

 Medical History  Depression   

 

 Surgical History  R fibula fx  2007

## 2019-02-20 NOTE — XMS REPORT
[facility]

 Created on: 10/27/2016



Ronda Farhat

External Reference #: [id number]

: 1970

Sex: Male



Demographics







 Address  [address] [place] [address]  [address]

 

 Home Phone  [phone]

 

 Preferred Language  Unknown

 

 Marital Status  Unknown

 

 [employer] Affiliation  Unknown

 

 Race  White

 

 Ethnic Group  Not  or 





Author







 Author  Johanna See

 

 [facility]  eClinicalWorks

 

 Address  Unknown

 

 Phone  Unavailable







Care Team Providers







 Care Team Member Name  Role  Phone

 

 Johanna See    Unavailable



                                                                



Allergies

          No Known Allergies                                                   
                                     



Problems

          





 Problem Type  Condition  Code  Onset Dates  Condition Status

 

 Assessment  Influenza vaccine needed  Z23     Active

 

 Problem  Mixed hyperlipidemia  E78.2     Active

 

 Assessment  Acquired immunodeficiency syndrome  B20     Active

 

 Problem  Crushing injury of right foot, sequela  S97.81XS     Active

 

 Problem  Arthralgia of right ankle  M25.571     Active

 

 Problem  Erectile dysfunction, unspecified erectile dysfunction type  N52.9   
  Active

 

 Problem  Acquired immunodeficiency syndrome  B20     Active

 

 Problem  Benign essential hypertension  I10     Active

 

 Problem  Cigarette smoker  F17.210     Active

 

 Problem  Depression  F32.9     Active

 

 Assessment  Crushing injury of right foot, sequela  S97.81XS     Active

 

 Assessment  Arthralgia of right ankle  M25.571     Active

 

 Assessment  Erectile dysfunction, unspecified erectile dysfunction type  N52.9
     Active



                                                                               
                                                                               
                                                  



Medications

          





 Medication  Code System  Code  Instructions  Start Date  End Date  Status  
Dosage

 

 Descovy  NDC  [address]-47  200-25 mg Orally Once a day  2016        
1 Tablet

 

 Zoloft  NDC  [address]-06  100 MG Orally Once a day  2015        1 
tablet

 

 Evotaz  NDC  5649-9051-82  300/150 mg Oral Daily  May 15, 2015        1

 

 Pravastatin Sodium  NDC  [address]-10  40 MG Orally Once a day  2015 
       1 tablet

 

 Evotaz  NDC  50687814118  300-150             TAKE ONE TABLET BY MOUTH DAILY

 

 Ibuprofen  NDC  20509723765  800             TAKE ONE TABLET BY MOUTH THREE 
TIMES DAILY AS NEEDED

 

 Voltaren  NDC  [address]-40  1 % Transdermal four times a day  Oct 21, 2016  
      2-4 gm apply to right foot/ankle

 

 Wellbutrin SR  NDC  [address]-17  150 MG Orally 2 tabs qam and 1 tab qpm  
2015        1 tablet

 

 Lisinopril  NDC  76950106825  20 Orally Once a day           2 tablet s

 

 Cialis  NDC  [address]-04  20 MG Orally every 72 hrs  May 17, 2013        1 
tablet as needed



                                                                               
                                                                               
                    



Procedures

          





 Procedure  Coding System  Code  Date

 

 T CELL, ABSOLUTE COUNT/RATIO  CPT-4  58228  Oct 21, 2016

 

 COMPREHEN METABOLIC PANEL  CPT-4  35104  Oct 21, 2016

 

 HIV-1, DNA, QUANT  CPT-4  33733  Oct 21, 2016

 

 FLU VAC NO PRSV 4 SUNI 3 YRS+  CPT-4  96458  Oct 21, 2016

 

 Office Visit, Est Pt., Level 4  CPT-4  72689  Oct 21, 2016

 

 IMMUNIZATION ADMIN  CPT-4  30548  Oct 21, 2016



                                                                               
                                                                     



Vital Signs

          





 Date/Time:  Oct 21, 2016

 

 Temperature  97.4 F

 

 Weight  200 lbs

 

 Height  72 in

 

 Respiratory Rate  16 /min

 

 Cardiac Monitoring Heart Rate  74 /min

 

 Blood Pressure Diastolic  90 mm Hg

 

 Blood Pressure Systolic  130 mm Hg

 

 BMI  27.12 Index



                                                                    



Results

          





 Name  Result  Date  Reference Range  Unit  Abnormality Flag

 

 CD4/CD8 Ratio Profile 82636               

 

 ----Monocytes(Absolute)  0.9  05168884  0.1-0.9   x10E3/uL   

 

 ----Eos (Absolute)  0.2  70392537  0.0-0.4   x10E3/uL   

 

 ----Baso (Absolute)  0.0  86025239  0.0-0.2   x10E3/uL   

 

 ----Immature Granulocytes  0  11828634     %   

 

 ----Immature Grans (Abs)  0.0  97864367  0.0-0.1   x10E3/uL   

 

 ----NRBC  NP  74937533         

 

 ----Hematology Comments:  NP  [id number]         

 

 ----Immature Cells  NP  2016         

 

 ----Neutrophils (Absolute)  4.3  91812699  1.4-7.0   x10E3/uL   

 

 ----Lymphs (Absolute)  2.5  49235091  0.7-3.1   x10E3/uL   

 

 ----MCH  31.4  31722381  26.6-33.0   pg   

 

 ----MCV  92  58048938  79-97   fL   

 

 ----Abs. CD 8 Suppressor  950  64628772  109-897   /uL  H

 

 ----Basos  0  02088338     %   

 

 ----% CD 8 Pos. Lymph.  38.0  09190339  12.0-35.5   %  H

 

 ----Eos  2  47941415     %   

 

 ----CD4/CD8 Ratio  1.17  01336017  0.92-3.72       

 

 ----Monocytes  11  37787571     %   

 

 ----WBC  7.8  03499896  3.4-10.8   x10E3/uL   

 

 ----Lymphs  32  15121455     %   

 

 ----RBC  4.40  56615690  4.14-5.80   x10E6/uL   

 

 ----Neutrophils  55  00401433     %   

 

 ----Hemoglobin  13.8  22780914  12.6-17.7   g/dL   

 

 ----Platelets  209  06846618  150-379   x10E3/uL   

 

 ----RDW  13.1  00353911  12.3-15.4   %   

 

 ----Hematocrit  40.6  85305411  37.5-51.0   %   

 

 ----Absolute CD 4 [place]  1110  62520447  359-1519   /uL   

 

 ----MCHC  34.0  12630766  31.5-35.7   g/dL   

 

 ----% CD 4 Pos. Lymph.  44.4  99740735  30.8-58.5   %   

 

 Human Immunodeficiency Virus (HIV-1), Quantitative, Real-time PCR (graph) 
54843               

 

 ----HIV-1 RNA by PCR  <20  53037570     copies/mL   

 

 ----log10 HIV-1 RNA  TNP  60594495     ubb07wrgf/mL   

 

 Metabolic Panel (14), Comprehensive (CMP) 24719               

 

 ----Sodium, Serum  144  10511960  136-144   mmol/L   

 

 ----BUN/Creatinine Ratio  8  44154125  9-20      L

 

 ----Chloride, Serum  105  06522872     mmol/L   

 

 ----Potassium, Serum  3.7  47516928  3.5-5.2   mmol/L   

 

 ----Calcium, Serum  8.6  75842946  8.7-10.2   mg/dL  L

 

 ----Protein, Total, Serum  6.3  26672798  6.0-8.5   g/dL   

 

 ----Carbon Dioxide, Total  25  54381894  18-29   mmol/L   

 

 ----A/G Ratio  1.9  54760662  1.1-2.5       

 

 ----eGFR If NonAfricn Am  87  62359336      >59   mL/min/1.73   

 

 ----Bilirubin, Total  1.3  29942373  0.0-1.2   mg/dL  H

 

 ----eGFR If Africn Am  100  2016      >59   mL/min/1.73   

 

 ----BUN  8  2016  6-24   mg/dL   

 

 ----Albumin, Serum  4.1  2016  3.5-5.5   g/dL   

 

 ----Globulin, Total  2.2  2016  1.5-4.5   g/dL   

 

 ----Creatinine, Serum  1.02  2016  0.76-1.27   mg/dL   

 

 ----ALT (SGPT)  11  2016  0-44   IU/L   

 

 ----Glucose, Serum  80  08550967  65-99   mg/dL   

 

 ----Alkaline Phosphatase, S  123  43972918     IU/L  H

 

 ----AST (SGOT)  14  2016  0-40   IU/L   



                                                                               
                             



Immunizations

          





 Vaccine  Administration Date

 

 Influenza Split 3 yrs > (QUAD)  Oct 21, 2016



                                                                    



Summary Purpose

          eClinicalWorks Submission

## 2019-02-21 NOTE — OPERATIVE REPORT
DATE OF SERVICE:  02/20/2019



PREOPERATIVE DIAGNOSES:

1.  Gastritis.

2.  Melena.



POSTOPERATIVE DIAGNOSES:

1.  Gastritis.

2.  Duodenitis.

3.  Padilla's esophagus.

4.  Hiatal hernia.

5.  Colon polyps.

6.  Diverticula.

7.  Internal hemorrhoids.



PROCEDURES:

1.  EGD with biopsy.

2.  Colonoscopy with snare polypectomy.



SURGEON:

Wiley Stephenson DO



FIRST ASSISTANT:

None.



ANESTHESIA:

IV sedation by CRNA.



SPECIMEN:

One biopsy from duodenum, one from the antrum and three biopsies from the GE

junction.  Two polyps from the ascending colon, two polyps from the descending

colon.



BLOOD LOSS:

Scant.



FLUIDS:

Per anesthesia.



POSTOPERATIVE CONDITION:

Stable.



INDICATION FOR PROCEDURE:

The patient is a 48-year-old male who has had some melena and needed a

colonoscopy, also had some chronic gastritis, needed EGD.



FINDINGS:

The patient had some inflammation in the duodenum as well as inflammation in the

antrum and it looked like he had Padilla's esophagus in the colon.  He had

multiple polyps removed, some diverticula and some internal hemorrhoids.



PROCEDURE NOTE:

After informed consent was obtained, the patient was brought to the endoscopy

suite, placed in the left lateral decubitus position.  He was administered IV

sedation by CRNA and then monitored vitals the entire time, heart rate, blood

pressure and pulse ox and the scope inserted first down the mouth through the

esophagus into the stomach and then towards the antrum.  Antrum looked

erythematous and likely some gastritis, pushed into the duodenum, thought this

would look like duodenitis, took a picture.  Pushed pass this into the second

portion of the duodenum.  This looked fine.  Pulled back into the first portion

of duodenum, did a biopsy here, pulled back into the antrum, did another biopsy.

 I retroflexed the scope, saw small hiatal hernia and then pulled the scope up

into the esophagus at the GE junction on the way in and looked like probably

Padilla's esophagus with some creeping up of the Z line, elected to do 3

biopsies here.  Good biopsies obtained then pulled scope out of esophagus and 
into the mouth. 

Switched gloves, switched scopes, went to the other side, start the colonoscopy.

 Pushed the scope in all the way to 160 cm, able to get to the cecum, took a

picture of the appendiceal orifice, noted the ileocecal valve and then slowly

withdrew the scope insufflating look circumferentially at the walls looking up

the ascending colon, saw a polyp, did hot snare polypectomy of this and then

pulled up a little bit further, saw another polyp, did another snare polypectomy

and then up to the hepatic flexure, then down the transverse colon, the splenic

flexure into the descending colon and then descending colon, saw two more

polyps, did two more snare polypectomies of these and then continued down into

the sigmoid colon and finally into the rectum, retroflexed the rectal vault, saw

some minimal internal hemorrhoids, took a picture of this and removed the scope.

 The patient tolerated the procedure and he was recovered in the endoscopy

suite.





Job ID: [id number]

DocumentID: [id number]

Dictated Date:  02/20/2019 15:44:54

Transcription Date: 02/21/2019 00:17:34

Dictated By: WILEY STEPHENSON DO

[email or web address]